# Patient Record
Sex: MALE | Race: WHITE | Employment: OTHER | URBAN - NONMETROPOLITAN AREA
[De-identification: names, ages, dates, MRNs, and addresses within clinical notes are randomized per-mention and may not be internally consistent; named-entity substitution may affect disease eponyms.]

---

## 2017-10-16 ENCOUNTER — HOSPITAL ENCOUNTER (OUTPATIENT)
Age: 62
Discharge: HOME | End: 2017-10-16
Payer: COMMERCIAL

## 2017-10-16 ENCOUNTER — HOSPITAL ENCOUNTER (OUTPATIENT)
Dept: GENERAL RADIOLOGY | Age: 62
Discharge: HOME | End: 2017-10-16
Payer: COMMERCIAL

## 2017-10-16 DIAGNOSIS — R52 PAIN: ICD-10-CM

## 2017-10-16 PROCEDURE — 73502 X-RAY EXAM HIP UNI 2-3 VIEWS: CPT

## 2020-12-07 ENCOUNTER — NURSE ONLY (OUTPATIENT)
Dept: LAB | Age: 65
End: 2020-12-07

## 2021-12-06 PROBLEM — E78.5 HYPERLIPIDEMIA: Status: ACTIVE | Noted: 2021-12-06

## 2021-12-06 PROBLEM — I10 HYPERTENSION: Status: ACTIVE | Noted: 2021-12-06

## 2021-12-06 PROBLEM — E11.9 TYPE 2 DIABETES MELLITUS WITHOUT COMPLICATION, WITHOUT LONG-TERM CURRENT USE OF INSULIN (HCC): Status: ACTIVE | Noted: 2021-12-06

## 2021-12-06 PROBLEM — G25.81 RESTLESS LEG: Status: ACTIVE | Noted: 2021-12-06

## 2021-12-29 ENCOUNTER — HOSPITAL ENCOUNTER (OUTPATIENT)
Age: 66
Discharge: HOME | End: 2021-12-29
Payer: MEDICARE

## 2021-12-29 ENCOUNTER — HOSPITAL ENCOUNTER (OUTPATIENT)
Dept: GENERAL RADIOLOGY | Age: 66
Discharge: HOME | End: 2021-12-29
Payer: MEDICARE

## 2021-12-29 DIAGNOSIS — M16.11 PRIMARY OSTEOARTHRITIS OF RIGHT HIP: ICD-10-CM

## 2021-12-29 LAB
ANION GAP SERPL CALCULATED.3IONS-SCNC: 13 MEQ/L (ref 8–16)
BACTERIA: ABNORMAL /HPF
BASOPHILS # BLD: 0.4 %
BASOPHILS ABSOLUTE: 0 THOU/MM3 (ref 0–0.1)
BILIRUBIN URINE: NEGATIVE
BLOOD, URINE: NEGATIVE
BUN BLDV-MCNC: 13 MG/DL (ref 7–22)
CALCIUM SERPL-MCNC: 9.6 MG/DL (ref 8.5–10.5)
CASTS 2: ABNORMAL /LPF
CASTS UA: ABNORMAL /LPF
CHARACTER, URINE: CLEAR
CHLORIDE BLD-SCNC: 102 MEQ/L (ref 98–111)
CO2: 23 MEQ/L (ref 23–33)
COLOR: YELLOW
CREAT SERPL-MCNC: 0.9 MG/DL (ref 0.4–1.2)
CRYSTALS, UA: ABNORMAL
EKG ATRIAL RATE: 63 BPM
EKG P AXIS: 67 DEGREES
EKG P-R INTERVAL: 174 MS
EKG Q-T INTERVAL: 376 MS
EKG QRS DURATION: 74 MS
EKG QTC CALCULATION (BAZETT): 384 MS
EKG R AXIS: 55 DEGREES
EKG T AXIS: 55 DEGREES
EKG VENTRICULAR RATE: 63 BPM
EOSINOPHIL # BLD: 3.1 %
EOSINOPHILS ABSOLUTE: 0.2 THOU/MM3 (ref 0–0.4)
EPITHELIAL CELLS, UA: ABNORMAL /HPF
ERYTHROCYTE [DISTWIDTH] IN BLOOD BY AUTOMATED COUNT: 12.9 % (ref 11.5–14.5)
ERYTHROCYTE [DISTWIDTH] IN BLOOD BY AUTOMATED COUNT: 43.2 FL (ref 35–45)
GFR SERPL CREATININE-BSD FRML MDRD: 84 ML/MIN/1.73M2
GLUCOSE BLD-MCNC: 151 MG/DL (ref 70–108)
GLUCOSE URINE: NEGATIVE MG/DL
HCT VFR BLD CALC: 48 % (ref 42–52)
HEMOGLOBIN: 16.2 GM/DL (ref 14–18)
IMMATURE GRANS (ABS): 0.01 THOU/MM3 (ref 0–0.07)
IMMATURE GRANULOCYTES: 0.2 %
KETONES, URINE: ABNORMAL
LEUKOCYTE ESTERASE, URINE: NEGATIVE
LYMPHOCYTES # BLD: 24.3 %
LYMPHOCYTES ABSOLUTE: 1.2 THOU/MM3 (ref 1–4.8)
MCH RBC QN AUTO: 31 PG (ref 26–33)
MCHC RBC AUTO-ENTMCNC: 33.8 GM/DL (ref 32.2–35.5)
MCV RBC AUTO: 91.8 FL (ref 80–94)
MISCELLANEOUS 2: ABNORMAL
MONOCYTES # BLD: 6.9 %
MONOCYTES ABSOLUTE: 0.3 THOU/MM3 (ref 0.4–1.3)
NITRITE, URINE: NEGATIVE
NUCLEATED RED BLOOD CELLS: 0 /100 WBC
PH UA: 5 (ref 5–9)
PLATELET # BLD: 214 THOU/MM3 (ref 130–400)
PMV BLD AUTO: 11.3 FL (ref 9.4–12.4)
POTASSIUM SERPL-SCNC: 4.3 MEQ/L (ref 3.5–5.2)
PROTEIN UA: ABNORMAL
RBC # BLD: 5.23 MILL/MM3 (ref 4.7–6.1)
RBC URINE: ABNORMAL /HPF
RENAL EPITHELIAL, UA: ABNORMAL
SEG NEUTROPHILS: 65.1 %
SEGMENTED NEUTROPHILS ABSOLUTE COUNT: 3.2 THOU/MM3 (ref 1.8–7.7)
SODIUM BLD-SCNC: 138 MEQ/L (ref 135–145)
SPECIFIC GRAVITY, URINE: 1.02 (ref 1–1.03)
UROBILINOGEN, URINE: 0.2 EU/DL (ref 0–1)
WBC # BLD: 4.9 THOU/MM3 (ref 4.8–10.8)
WBC UA: ABNORMAL /HPF
YEAST: ABNORMAL

## 2021-12-29 PROCEDURE — 36415 COLL VENOUS BLD VENIPUNCTURE: CPT

## 2021-12-29 PROCEDURE — 80048 BASIC METABOLIC PNL TOTAL CA: CPT

## 2021-12-29 PROCEDURE — 87641 MR-STAPH DNA AMP PROBE: CPT

## 2021-12-29 PROCEDURE — 81001 URINALYSIS AUTO W/SCOPE: CPT

## 2021-12-29 PROCEDURE — 93005 ELECTROCARDIOGRAM TRACING: CPT | Performed by: ORTHOPAEDIC SURGERY

## 2021-12-29 PROCEDURE — 85025 COMPLETE CBC W/AUTO DIFF WBC: CPT

## 2021-12-29 PROCEDURE — 71046 X-RAY EXAM CHEST 2 VIEWS: CPT

## 2021-12-30 LAB — MRSA SCREEN RT-PCR: NEGATIVE

## 2022-02-04 ENCOUNTER — HOSPITAL ENCOUNTER (OUTPATIENT)
Age: 67
Setting detail: OBSERVATION
Discharge: HOME | End: 2022-02-05
Attending: HOSPITALIST | Admitting: HOSPITALIST
Payer: MEDICARE

## 2022-02-04 ENCOUNTER — APPOINTMENT (OUTPATIENT)
Dept: CT IMAGING | Age: 67
End: 2022-02-04
Payer: MEDICARE

## 2022-02-04 ENCOUNTER — APPOINTMENT (OUTPATIENT)
Dept: GENERAL RADIOLOGY | Age: 67
End: 2022-02-04
Payer: MEDICARE

## 2022-02-04 ENCOUNTER — APPOINTMENT (OUTPATIENT)
Dept: CARDIAC CATH/INVASIVE PROCEDURES | Age: 67
End: 2022-02-04
Payer: MEDICARE

## 2022-02-04 DIAGNOSIS — R07.9 CHEST PAIN, UNSPECIFIED TYPE: Primary | ICD-10-CM

## 2022-02-04 LAB
ACTIVATED CLOTTING TIME: 303 SECONDS (ref 1–150)
ALBUMIN SERPL-MCNC: 4.1 G/DL (ref 3.5–5.1)
ALP BLD-CCNC: 139 U/L (ref 38–126)
ALT SERPL-CCNC: 15 U/L (ref 11–66)
ANION GAP SERPL CALCULATED.3IONS-SCNC: 13 MEQ/L (ref 8–16)
AST SERPL-CCNC: 17 U/L (ref 5–40)
BASOPHILS # BLD: 0.3 %
BASOPHILS ABSOLUTE: 0 THOU/MM3 (ref 0–0.1)
BILIRUB SERPL-MCNC: 0.3 MG/DL (ref 0.3–1.2)
BUN BLDV-MCNC: 17 MG/DL (ref 7–22)
CALCIUM SERPL-MCNC: 8.9 MG/DL (ref 8.5–10.5)
CHLORIDE BLD-SCNC: 104 MEQ/L (ref 98–111)
CO2: 23 MEQ/L (ref 23–33)
CREAT SERPL-MCNC: 0.8 MG/DL (ref 0.4–1.2)
D-DIMER QUANTITATIVE: 1325 NG/ML FEU (ref 0–500)
EOSINOPHIL # BLD: 1.1 %
EOSINOPHILS ABSOLUTE: 0.1 THOU/MM3 (ref 0–0.4)
ERYTHROCYTE [DISTWIDTH] IN BLOOD BY AUTOMATED COUNT: 12.8 % (ref 11.5–14.5)
ERYTHROCYTE [DISTWIDTH] IN BLOOD BY AUTOMATED COUNT: 42.3 FL (ref 35–45)
FLU A ANTIGEN: NEGATIVE
FLU B ANTIGEN: NEGATIVE
GFR SERPL CREATININE-BSD FRML MDRD: > 90 ML/MIN/1.73M2
GLUCOSE BLD-MCNC: 115 MG/DL (ref 70–108)
GLUCOSE BLD-MCNC: 139 MG/DL (ref 70–108)
GLUCOSE BLD-MCNC: 163 MG/DL (ref 70–108)
GLUCOSE BLD-MCNC: 170 MG/DL (ref 70–108)
GLUCOSE BLD-MCNC: 184 MG/DL (ref 70–108)
HCT VFR BLD CALC: 42.2 % (ref 42–52)
HEMOGLOBIN: 14 GM/DL (ref 14–18)
IMMATURE GRANS (ABS): 0.04 THOU/MM3 (ref 0–0.07)
IMMATURE GRANULOCYTES: 0.4 %
LV EF: 55 %
LVEF MODALITY: NORMAL
LYMPHOCYTES # BLD: 12.1 %
LYMPHOCYTES ABSOLUTE: 1.2 THOU/MM3 (ref 1–4.8)
MCH RBC QN AUTO: 30.3 PG (ref 26–33)
MCHC RBC AUTO-ENTMCNC: 33.2 GM/DL (ref 32.2–35.5)
MCV RBC AUTO: 91.3 FL (ref 80–94)
MONOCYTES # BLD: 6.1 %
MONOCYTES ABSOLUTE: 0.6 THOU/MM3 (ref 0.4–1.3)
NUCLEATED RED BLOOD CELLS: 0 /100 WBC
OSMOLALITY CALCULATION: 285.7 MOSMOL/KG (ref 275–300)
PLATELET # BLD: 264 THOU/MM3 (ref 130–400)
PMV BLD AUTO: 10.2 FL (ref 9.4–12.4)
POTASSIUM SERPL-SCNC: 3.9 MEQ/L (ref 3.5–5.2)
RBC # BLD: 4.62 MILL/MM3 (ref 4.7–6.1)
SARS-COV-2, NAAT: NOT  DETECTED
SEG NEUTROPHILS: 80 %
SEGMENTED NEUTROPHILS ABSOLUTE COUNT: 8 THOU/MM3 (ref 1.8–7.7)
SODIUM BLD-SCNC: 140 MEQ/L (ref 135–145)
TOTAL PROTEIN: 6.6 G/DL (ref 6.1–8)
TROPONIN T: 0.04 NG/ML
TROPONIN T: 0.34 NG/ML
TROPONIN T: 0.94 NG/ML
WBC # BLD: 10 THOU/MM3 (ref 4.8–10.8)

## 2022-02-04 PROCEDURE — 2500000003 HC RX 250 WO HCPCS

## 2022-02-04 PROCEDURE — G0378 HOSPITAL OBSERVATION PER HR: HCPCS

## 2022-02-04 PROCEDURE — 99284 EMERGENCY DEPT VISIT MOD MDM: CPT

## 2022-02-04 PROCEDURE — C1769 GUIDE WIRE: HCPCS

## 2022-02-04 PROCEDURE — 96372 THER/PROPH/DIAG INJ SC/IM: CPT

## 2022-02-04 PROCEDURE — 6360000002 HC RX W HCPCS

## 2022-02-04 PROCEDURE — 6370000000 HC RX 637 (ALT 250 FOR IP): Performed by: INTERNAL MEDICINE

## 2022-02-04 PROCEDURE — 93458 L HRT ARTERY/VENTRICLE ANGIO: CPT

## 2022-02-04 PROCEDURE — 6360000004 HC RX CONTRAST MEDICATION: Performed by: NURSE PRACTITIONER

## 2022-02-04 PROCEDURE — C1874 STENT, COATED/COV W/DEL SYS: HCPCS

## 2022-02-04 PROCEDURE — C1725 CATH, TRANSLUMIN NON-LASER: HCPCS

## 2022-02-04 PROCEDURE — 99220 PR INITIAL OBSERVATION CARE/DAY 70 MINUTES: CPT | Performed by: HOSPITALIST

## 2022-02-04 PROCEDURE — 71275 CT ANGIOGRAPHY CHEST: CPT

## 2022-02-04 PROCEDURE — 6370000000 HC RX 637 (ALT 250 FOR IP)

## 2022-02-04 PROCEDURE — 36415 COLL VENOUS BLD VENIPUNCTURE: CPT

## 2022-02-04 PROCEDURE — 92928 PRQ TCAT PLMT NTRAC ST 1 LES: CPT | Performed by: INTERNAL MEDICINE

## 2022-02-04 PROCEDURE — 6360000004 HC RX CONTRAST MEDICATION: Performed by: INTERNAL MEDICINE

## 2022-02-04 PROCEDURE — 87635 SARS-COV-2 COVID-19 AMP PRB: CPT

## 2022-02-04 PROCEDURE — 87804 INFLUENZA ASSAY W/OPTIC: CPT

## 2022-02-04 PROCEDURE — 93306 TTE W/DOPPLER COMPLETE: CPT

## 2022-02-04 PROCEDURE — C1894 INTRO/SHEATH, NON-LASER: HCPCS

## 2022-02-04 PROCEDURE — 80053 COMPREHEN METABOLIC PANEL: CPT

## 2022-02-04 PROCEDURE — 71046 X-RAY EXAM CHEST 2 VIEWS: CPT

## 2022-02-04 PROCEDURE — 82948 REAGENT STRIP/BLOOD GLUCOSE: CPT

## 2022-02-04 PROCEDURE — 2580000003 HC RX 258: Performed by: INTERNAL MEDICINE

## 2022-02-04 PROCEDURE — 93458 L HRT ARTERY/VENTRICLE ANGIO: CPT | Performed by: INTERNAL MEDICINE

## 2022-02-04 PROCEDURE — 84484 ASSAY OF TROPONIN QUANT: CPT

## 2022-02-04 PROCEDURE — 6360000002 HC RX W HCPCS: Performed by: NURSE PRACTITIONER

## 2022-02-04 PROCEDURE — 85379 FIBRIN DEGRADATION QUANT: CPT

## 2022-02-04 PROCEDURE — 85347 COAGULATION TIME ACTIVATED: CPT

## 2022-02-04 PROCEDURE — C1887 CATHETER, GUIDING: HCPCS

## 2022-02-04 PROCEDURE — 93005 ELECTROCARDIOGRAM TRACING: CPT | Performed by: NURSE PRACTITIONER

## 2022-02-04 PROCEDURE — 85025 COMPLETE CBC W/AUTO DIFF WBC: CPT

## 2022-02-04 PROCEDURE — 2580000003 HC RX 258: Performed by: HOSPITALIST

## 2022-02-04 PROCEDURE — 99223 1ST HOSP IP/OBS HIGH 75: CPT | Performed by: INTERNAL MEDICINE

## 2022-02-04 PROCEDURE — C9600 PERC DRUG-EL COR STENT SING: HCPCS

## 2022-02-04 PROCEDURE — 6370000000 HC RX 637 (ALT 250 FOR IP): Performed by: HOSPITALIST

## 2022-02-04 RX ORDER — SODIUM CHLORIDE 0.9 % (FLUSH) 0.9 %
10 SYRINGE (ML) INJECTION EVERY 12 HOURS SCHEDULED
Status: DISCONTINUED | OUTPATIENT
Start: 2022-02-04 | End: 2022-02-04 | Stop reason: SDUPTHER

## 2022-02-04 RX ORDER — ACETAMINOPHEN 325 MG/1
650 TABLET ORAL EVERY 4 HOURS PRN
Status: DISCONTINUED | OUTPATIENT
Start: 2022-02-04 | End: 2022-02-05 | Stop reason: HOSPADM

## 2022-02-04 RX ORDER — SODIUM CHLORIDE 0.9 % (FLUSH) 0.9 %
10 SYRINGE (ML) INJECTION PRN
Status: DISCONTINUED | OUTPATIENT
Start: 2022-02-04 | End: 2022-02-04 | Stop reason: SDUPTHER

## 2022-02-04 RX ORDER — POTASSIUM CHLORIDE 7.45 MG/ML
10 INJECTION INTRAVENOUS PRN
Status: DISCONTINUED | OUTPATIENT
Start: 2022-02-04 | End: 2022-02-05 | Stop reason: HOSPADM

## 2022-02-04 RX ORDER — DEXTROSE MONOHYDRATE 25 G/50ML
12.5 INJECTION, SOLUTION INTRAVENOUS PRN
Status: DISCONTINUED | OUTPATIENT
Start: 2022-02-04 | End: 2022-02-04 | Stop reason: SDUPTHER

## 2022-02-04 RX ORDER — ACETAMINOPHEN 325 MG/1
650 TABLET ORAL EVERY 6 HOURS PRN
Status: DISCONTINUED | OUTPATIENT
Start: 2022-02-04 | End: 2022-02-04 | Stop reason: SDUPTHER

## 2022-02-04 RX ORDER — ONDANSETRON 4 MG/1
4 TABLET, ORALLY DISINTEGRATING ORAL EVERY 8 HOURS PRN
Status: DISCONTINUED | OUTPATIENT
Start: 2022-02-04 | End: 2022-02-05 | Stop reason: HOSPADM

## 2022-02-04 RX ORDER — ROSUVASTATIN CALCIUM 20 MG/1
40 TABLET, COATED ORAL NIGHTLY
Status: DISCONTINUED | OUTPATIENT
Start: 2022-02-04 | End: 2022-02-05 | Stop reason: HOSPADM

## 2022-02-04 RX ORDER — HEPARIN SODIUM 10000 [USP'U]/100ML
5-30 INJECTION, SOLUTION INTRAVENOUS CONTINUOUS
Status: DISCONTINUED | OUTPATIENT
Start: 2022-02-04 | End: 2022-02-04

## 2022-02-04 RX ORDER — ACETAMINOPHEN 650 MG/1
650 SUPPOSITORY RECTAL EVERY 6 HOURS PRN
Status: DISCONTINUED | OUTPATIENT
Start: 2022-02-04 | End: 2022-02-05 | Stop reason: HOSPADM

## 2022-02-04 RX ORDER — HEPARIN SODIUM 1000 [USP'U]/ML
60 INJECTION, SOLUTION INTRAVENOUS; SUBCUTANEOUS ONCE
Status: DISCONTINUED | OUTPATIENT
Start: 2022-02-04 | End: 2022-02-04

## 2022-02-04 RX ORDER — NICOTINE POLACRILEX 4 MG
15 LOZENGE BUCCAL PRN
Status: DISCONTINUED | OUTPATIENT
Start: 2022-02-04 | End: 2022-02-05 | Stop reason: HOSPADM

## 2022-02-04 RX ORDER — SODIUM CHLORIDE 9 MG/ML
25 INJECTION, SOLUTION INTRAVENOUS PRN
Status: DISCONTINUED | OUTPATIENT
Start: 2022-02-04 | End: 2022-02-04 | Stop reason: SDUPTHER

## 2022-02-04 RX ORDER — SODIUM CHLORIDE 0.9 % (FLUSH) 0.9 %
5-40 SYRINGE (ML) INJECTION EVERY 12 HOURS SCHEDULED
Status: DISCONTINUED | OUTPATIENT
Start: 2022-02-04 | End: 2022-02-05 | Stop reason: HOSPADM

## 2022-02-04 RX ORDER — LISINOPRIL 20 MG/1
20 TABLET ORAL DAILY
Status: DISCONTINUED | OUTPATIENT
Start: 2022-02-04 | End: 2022-02-05

## 2022-02-04 RX ORDER — HEPARIN SODIUM 1000 [USP'U]/ML
30 INJECTION, SOLUTION INTRAVENOUS; SUBCUTANEOUS PRN
Status: DISCONTINUED | OUTPATIENT
Start: 2022-02-04 | End: 2022-02-04

## 2022-02-04 RX ORDER — DEXTROSE MONOHYDRATE 50 MG/ML
100 INJECTION, SOLUTION INTRAVENOUS PRN
Status: DISCONTINUED | OUTPATIENT
Start: 2022-02-04 | End: 2022-02-05 | Stop reason: HOSPADM

## 2022-02-04 RX ORDER — ASPIRIN 81 MG/1
81 TABLET ORAL DAILY
Status: DISCONTINUED | OUTPATIENT
Start: 2022-02-05 | End: 2022-02-05 | Stop reason: HOSPADM

## 2022-02-04 RX ORDER — ONDANSETRON 2 MG/ML
4 INJECTION INTRAMUSCULAR; INTRAVENOUS EVERY 6 HOURS PRN
Status: DISCONTINUED | OUTPATIENT
Start: 2022-02-04 | End: 2022-02-05 | Stop reason: HOSPADM

## 2022-02-04 RX ORDER — MAGNESIUM SULFATE IN WATER 40 MG/ML
2000 INJECTION, SOLUTION INTRAVENOUS PRN
Status: DISCONTINUED | OUTPATIENT
Start: 2022-02-04 | End: 2022-02-05 | Stop reason: HOSPADM

## 2022-02-04 RX ORDER — HEPARIN SODIUM 1000 [USP'U]/ML
60 INJECTION, SOLUTION INTRAVENOUS; SUBCUTANEOUS PRN
Status: DISCONTINUED | OUTPATIENT
Start: 2022-02-04 | End: 2022-02-04

## 2022-02-04 RX ORDER — SODIUM CHLORIDE 0.9 % (FLUSH) 0.9 %
5-40 SYRINGE (ML) INJECTION PRN
Status: DISCONTINUED | OUTPATIENT
Start: 2022-02-04 | End: 2022-02-05 | Stop reason: HOSPADM

## 2022-02-04 RX ORDER — POLYETHYLENE GLYCOL 3350 17 G/17G
17 POWDER, FOR SOLUTION ORAL DAILY PRN
Status: DISCONTINUED | OUTPATIENT
Start: 2022-02-04 | End: 2022-02-05 | Stop reason: HOSPADM

## 2022-02-04 RX ORDER — AMLODIPINE BESYLATE 5 MG/1
5 TABLET ORAL DAILY
Status: DISCONTINUED | OUTPATIENT
Start: 2022-02-04 | End: 2022-02-04

## 2022-02-04 RX ORDER — SODIUM CHLORIDE 9 MG/ML
25 INJECTION, SOLUTION INTRAVENOUS PRN
Status: DISCONTINUED | OUTPATIENT
Start: 2022-02-04 | End: 2022-02-05 | Stop reason: HOSPADM

## 2022-02-04 RX ORDER — ATORVASTATIN CALCIUM 10 MG/1
10 TABLET, FILM COATED ORAL DAILY
Status: DISCONTINUED | OUTPATIENT
Start: 2022-02-04 | End: 2022-02-04

## 2022-02-04 RX ORDER — PAROXETINE HYDROCHLORIDE 20 MG/1
20 TABLET, FILM COATED ORAL EVERY MORNING
Status: DISCONTINUED | OUTPATIENT
Start: 2022-02-04 | End: 2022-02-05 | Stop reason: HOSPADM

## 2022-02-04 RX ORDER — POTASSIUM CHLORIDE 20 MEQ/1
40 TABLET, EXTENDED RELEASE ORAL PRN
Status: DISCONTINUED | OUTPATIENT
Start: 2022-02-04 | End: 2022-02-05 | Stop reason: HOSPADM

## 2022-02-04 RX ADMIN — PAROXETINE HYDROCHLORIDE 20 MG: 20 TABLET, FILM COATED ORAL at 08:03

## 2022-02-04 RX ADMIN — TICAGRELOR 180 MG: 90 TABLET ORAL at 12:17

## 2022-02-04 RX ADMIN — INSULIN LISPRO 2 UNITS: 100 INJECTION, SOLUTION INTRAVENOUS; SUBCUTANEOUS at 17:16

## 2022-02-04 RX ADMIN — AMLODIPINE BESYLATE 5 MG: 5 TABLET ORAL at 08:04

## 2022-02-04 RX ADMIN — METOPROLOL TARTRATE 25 MG: 25 TABLET, FILM COATED ORAL at 21:43

## 2022-02-04 RX ADMIN — IOPAMIDOL 80 ML: 755 INJECTION, SOLUTION INTRAVENOUS at 03:42

## 2022-02-04 RX ADMIN — ASPIRIN 325 MG: 325 TABLET, COATED ORAL at 08:04

## 2022-02-04 RX ADMIN — ATORVASTATIN CALCIUM 10 MG: 10 TABLET, FILM COATED ORAL at 08:04

## 2022-02-04 RX ADMIN — ROSUVASTATIN CALCIUM 40 MG: 20 TABLET, FILM COATED ORAL at 21:50

## 2022-02-04 RX ADMIN — IOPAMIDOL 213 ML: 755 INJECTION, SOLUTION INTRAVENOUS at 12:17

## 2022-02-04 RX ADMIN — LISINOPRIL 20 MG: 20 TABLET ORAL at 08:04

## 2022-02-04 RX ADMIN — METOPROLOL TARTRATE 25 MG: 25 TABLET, FILM COATED ORAL at 12:48

## 2022-02-04 RX ADMIN — SODIUM CHLORIDE, PRESERVATIVE FREE 10 ML: 5 INJECTION INTRAVENOUS at 21:44

## 2022-02-04 RX ADMIN — SODIUM CHLORIDE, PRESERVATIVE FREE 10 ML: 5 INJECTION INTRAVENOUS at 08:04

## 2022-02-04 RX ADMIN — ENOXAPARIN SODIUM 105 MG: 150 INJECTION SUBCUTANEOUS at 03:50

## 2022-02-04 ASSESSMENT — ENCOUNTER SYMPTOMS
COUGH: 0
NAUSEA: 0
SORE THROAT: 0
VOMITING: 0
RHINORRHEA: 0
CHEST TIGHTNESS: 0
SHORTNESS OF BREATH: 1
COLOR CHANGE: 0

## 2022-02-04 NOTE — ED TRIAGE NOTES
Patient arrives to the ED with c/o mid sternal non radiating chest pain starting around 1130 pm while pt was at home sitting, pain increased after he walked up stairs. Denies SOB, denies n/v, Pt took aspirin 324mg at home, EMS administered nitro x3, pt arrives with no further pain. GCS 15.

## 2022-02-04 NOTE — BRIEF OP NOTE
[address] [address]  Sedation/Analgesia Post Sedation Record    Pt Name: Gabrielle Diana  Account number: [de-identified]  MRN: [id number]  YOB: 1955  Procedure Performed By: Niecy Araujo MD MD   Primary Care Physician: Muriel Gallegos APRN - CNP  Date: 2/4/2022    POST-PROCEDURE    Physicians/Assistants: Niecy Araujo MD MD     Procedure Performed:Cath/ PCI      Sedation/Anesthesia: Versed/ Fentanyl and 2% xylocaine local anesthesia. Estimated Blood Loss: < 50 ml. Specimens Removed: None         Disposition of Specimen: N/A        Complications: No Immediate Complications. Post-procedure Diagnosis/Findings:        NSTEMI   Severe 95% stenosis of proximal LAD, s/p successful PCI/MARYLOU     Recommendations:  Bed rest for 4 hours post procedure   Monitor on Telemetry   Optimize medical therapy for Coronary Artery Disease  Aggressive risk factor modification   Access site care  Antiplatelet therapy: ASA 81 mg PO daily and Birlinta 90 mg PO BID   High intensity statin therapy  Obtain a Full Echocardiogram   IVF: NS at 75 cc/h  AM Labs: BMP, CBC  Outpatient follow up in office in 2 weeks      Above findings and plan of care were discussed with patient (attempted to call his daughter, no answer), questions were answered, agreeable with plan.        Niecy Araujo MD, Michael Avalos   Electronically signed 2/4/2022 at 12:18 PM  Interventional Cardiology

## 2022-02-04 NOTE — CARE COORDINATION
2/4/22, 1:34 PM EST  DISCHARGE PLANNING EVALUATION:    Donna Cartagena       Admitted: 2/4/2022/ [facility] day: 0   Location: 3B-31/031-A Reason for admit: Chest pain [R07.9]  Chest pain, unspecified type [R07.9]   PMH:  has a past medical history of Depression, Diabetes mellitus ([address] Hyperlipidemia, Hypertension, Restless leg, and Sleep apnea. Procedure:   2/4 Cardiac cath: NSTEMI, Severe 95% stenosis of proximal LAD, s/p successful PCI/MARYLOU. Barriers to Discharge:  Admitted through ED as observation with chest pain. Troponins 0.042 and 0.339. [address] Cardiology. Room air, sats 92-98%. Norvasc, ASA, Lipitor, diabetes management, Lisinopril, Paxil, Lovenox. Electrolyte replacements. Taken to cath lab today, PCI/MARYLOU done. PCP: Roni Bucio, SISI - CNP   %    Patient Goals/Plan/Treatment Preferences: Spoke with Washington Hernandez. He lives at home with his wife. Pt had recent hip surgery, he has a cane, standard walker and a 2 wheeled walker at home. [facility] at this time. Transportation/Food Security/Housekeeping Addressed:  No issues identified.

## 2022-02-04 NOTE — SIGNIFICANT EVENT
Noted elevated troponin this AM.  Restarted BID lovenox at 1mg/kg. Changed to NPO. Patient asymptomatic and resting comfortably. Normal vital signs. Called Fritz, aware. Patient also to receive aspirin. Deferring plavix in case of possible CABG.

## 2022-02-04 NOTE — FLOWSHEET NOTE
Pt was alone at the time of the visit. He was dealing with chest pain but was in good spirit. He wanted to prayer to heal and he was anointed. 02/04/22 1618   Encounter Summary   Services provided to: Patient   Referral/Consult From: Rounding   Place of [address] [place] [address] Visiting No  (2/4)   Complexity of Encounter Moderate   Length of Encounter 15 minutes   Spiritual/Congregational   Type Spiritual support   Assessment Approachable;Calm   Intervention Prayer;Nurtured hope   Outcome Connection/belonging;Expressed gratitude;Encouraged; Hopeful   Sacraments   Sacrament of Sick-Anointing Anointed

## 2022-02-04 NOTE — H&P
Hospitalist - History & Physical      Patient: Juliana Burrell    Unit/Bed:3B-31/031-A  YOB: 1955  MRN: [id number]   Acct: [de-identified]   PCP: SISI Smith CNP    Date of Service: Pt seen/examined on 02/04/22  and Admitted to Observation with expected LOS less than two midnights due to medical therapy. Chief Complaint: Chest pain    Assessment and Plan:-  1. Chest pain -patient's history is consistent with typical angina. Has minimal elevation of troponin. Will follow troponin closely. Patient was given full dose Lovenox in the emergency room. For now we will reduce it down to DVT dose. If second troponin goes up, patient will need full anticoagulation. Cardiology consult. Patient will need stress test prior to discharge. Aspirin. 2. Diabetes mellitus -sliding-scale insulin  3. Essential hypertension hyperlipidemia -on statin. Continue home dose of amlodipine. History Of Present Illness: This is a 27-year-old male with past medical history significant for diabetes mellitus, hypertension and hyperlipidemia presenting with chest pain. Chest pain started when patient climbed up the stairs to go to bed. Pain was substernal region and was pressure like in quality. No significant radiation noted per patient. Chest pain was improved when he was given nitro in route to emergency room. Patient had stress test over 10 years ago which was negative. Patient never had cardiac cath in the past.  Patient denies history of smoking or drug abuse. Patient's chest pain-free at this time. Denies fever chills nausea vomiting diarrhea constipation. No palpitation. No shortness of breath or cough. No abdominal issue. No urinary symptoms. No acute change in neurological status.       Past Medical History:        Diagnosis Date    Depression     Diabetes mellitus ([address]     dx about 3 months ago    Hyperlipidemia     Hypertension     Restless leg     Sleep apnea Past Surgical History:        Procedure Laterality Date    JOINT REPLACEMENT      Rt hip replaced about 3 weeks ago       Home Medications:   No current facility-administered medications on file prior to encounter. Current Outpatient Medications on File Prior to Encounter   Medication Sig Dispense Refill    metFORMIN (GLUCOPHAGE-XR) 500 MG extended release tablet Take 1 tablet by mouth once daily with breakfast 30 tablet 11    amLODIPine (NORVASC) 5 MG tablet Take 1 tablet by mouth daily 90 tablet 3    lisinopril (PRINIVIL;ZESTRIL) 20 MG tablet Take 1 tablet by mouth daily 90 tablet 3    PARoxetine (PAXIL) 20 MG tablet Take 1 tablet by mouth every morning 90 tablet 3    atorvastatin (LIPITOR) 10 MG tablet Take 1 tablet by mouth daily 90 tablet 3    meloxicam (MOBIC) 15 MG tablet Take 15 mg by mouth daily (Patient not taking: Reported on 12/6/2021)         Allergies:    Patient has no known allergies. Social History:    reports that he has never smoked. His smokeless tobacco use includes chew. He reports previous drug use. He reports that he does not drink alcohol. Family History:       Problem Relation Age of Onset    Cancer Mother     Cancer Father        Diet:  ADULT DIET; Regular; 4 carb choices (60 gm/meal); No Added Salt (3-4 gm)    Review of systems:   Pertinent positives as noted in the HPI. All other systems reviewed and negative. PHYSICAL EXAM:  BP (!) 143/85   Pulse 68   Temp 98 °F (36.7 °C) (Oral)   Resp 18   Ht 5' 11\" (1.803 m)   Wt 240 lb (108.9 kg)   SpO2 98%   BMI 33.47 kg/m²   General appearance: No apparent distress, appears stated age and cooperative. HEENT: Normal cephalic, atraumatic without obvious deformity. Pupils equal, round, and reactive to light. Extra ocular muscles intact. Conjunctivae/corneas clear. Neck: Supple, with full range of motion. No jugular venous distention. Trachea midline. Respiratory:  Normal respiratory effort.  Clear to auscultation, bilaterally without Rales/Wheezes/Rhonchi. Cardiovascular: Regular rate and rhythm with normal S1/S2 without murmurs, rubs or gallops. Abdomen: Soft, non-tender, non-distended with normal bowel sounds. Musculoskeletal:  No clubbing, cyanosis or edema bilaterally. Skin: Skin color, texture, turgor normal.  No rashes or lesions. Neurologic:  Neurovascularly intact without any focal sensory/motor deficits. Cranial nerves: II-XII intact, grossly non-focal.  Psychiatric: Alert and oriented, thought content appropriate, normal insight  Capillary Refill: Brisk,< 3 seconds   Peripheral Pulses: +2 palpable, equal bilaterally     Labs:   Recent Labs     02/04/22 0200   WBC 10.0   HGB 14.0   HCT 42.2        Recent Labs     02/04/22 0200      K 3.9      CO2 23   BUN 17   CREATININE 0.8   CALCIUM 8.9     Recent Labs     02/04/22 0200   AST 17   ALT 15   BILITOT 0.3   ALKPHOS 139*     No results for input(s): INR in the last 72 hours. No results for input(s): Madhav Burris in the last 72 hours. Urinalysis:    Lab Results   Component Value Date    NITRU NEGATIVE 12/29/2021    WBCUA 0-2 12/29/2021    BACTERIA NONE SEEN 12/29/2021    RBCUA 0-2 12/29/2021    BLOODU NEGATIVE 12/29/2021    SPECGRAV >=1.030 12/06/2021    GLUCOSEU NEGATIVE 12/29/2021       Radiology:   CTA CHEST W WO CONTRAST   Final Result   No acute findings. No pulmonary embolism. Nonemergent/incidental findings in the report. This document has been electronically signed by: Travon Hammer MD on    02/04/2022 04:46 AM      All CTs at this facility use dose modulation techniques and iterative    reconstructions, and/or weight-based dosing   when appropriate to reduce radiation to a low as reasonably achievable. XR CHEST (2 VW)   Final Result   No acute findings.       This document has been electronically signed by: Travon Hammer MD on    02/04/2022 02:53 AM        XR CHEST (2 VW)    Result Date: 2/4/2022  Chest X-ray, 2 Views COMPARISON: None FINDINGS: No consolidation. No pleural effusion. No pneumothorax. No cardiomegaly. No acute fracture. No acute findings. This document has been electronically signed by: Liliya Lopes MD on 02/04/2022 02:53 AM    CTA CHEST W WO CONTRAST    Result Date: 2/4/2022  CT Angiography Chest W Contrast 3D Postprocessing COMPARISON: CR,SR - XR CHEST STANDARD (2 VW) - 02/04/2022 02:27 AM EST FINDINGS: No pulmonary embolism. No thoracic aortic aneurysm or dissection. No consolidation. No mass. Bilateral atelectasis. No pleural effusion. No pneumothorax. No cardiomegaly. No pericardial effusion. No pathologically enlarged lymph nodes. Calcified lymph nodes. No acute fracture. Degenerative changes in the spine. Small hiatal hernia. Calcified granulomas in the spleen. No acute findings. No pulmonary embolism. Nonemergent/incidental findings in the report. This document has been electronically signed by: Liliya Lopes MD on 02/04/2022 04:46 AM All CTs at this facility use dose modulation techniques and iterative reconstructions, and/or weight-based dosing when appropriate to reduce radiation to a low as reasonably achievable. EKG: Sinus rhythm. No acute ischemic changes.     Electronically signed by Walt Hughes DO on 2/4/2022 at 4:59 AM

## 2022-02-04 NOTE — ED NOTES
Bed: 010A  Expected date:   Expected time:   Means of arrival:   Comments:  Bibi Rangel RN  02/04/22 0151

## 2022-02-04 NOTE — CONSULTS
REason Of consultation-  Cp and elevated troponin    Primary cardiologist- none    HPI   Mr. Vikas Steen is a 78 y.o. male who presents to the ED for evaluation of chest pain that started  around 11 PM he was walking upstairs to go to bed. The chest pain was severe substernal chest pain, 8/10, pressure and  Sharp, non radiating no associated symptom of sob or diaphoresis. The chest pain persisted for several minutes with no resolution and hence call 911 And EMS brought him to ER. In the squad pat was give 3 doses of NTG SL and the chest pain resolved with that. No chest pain then after. Hx of sob on exertion and no chest prior in the last several years. He notes he had similar chest pain approximately 10 years ago, at that time he had a stress test and an echocardiogram that was negative. He never went on to have cardiac cath. He notes a history of hypertension, diabetes, hyperlipidemia. He notes 3 weeks ago he had a left total hip replacement. He notes he has been having increased bilateral knee arthritis, he notes this is chronic but worsened recently. He denies any swelling in his legs. He denies nausea vomiting.     Currently cp free  And Lovenox around 3 am        Chief Complaint   Patient presents with    Chest Pain       Patient Active Problem List   Diagnosis    Hypertension    Type 2 diabetes mellitus without complication, without long-term current use of insulin (HCC)    Hyperlipidemia    Restless leg    Chest pain       Past Surgical History:   Procedure Laterality Date    JOINT REPLACEMENT      Rt hip replaced about 3 weeks ago       No Known Allergies     Family History   Problem Relation Age of Onset    Cancer Mother     Cancer Father         Social History     Socioeconomic History    Marital status:      Spouse name: Not on file    Number of children: Not on file    Years of education: Not on file    Highest education level: Not on file   Occupational History    Not on file   Tobacco Use    Smoking status: Never Smoker    Smokeless tobacco: Current User     Types: Chew   Substance and Sexual Activity    Alcohol use: No    Drug use: Not Currently    Sexual activity: Not on file   Other Topics Concern    Not on file   Social History Narrative    Not on file     Social Determinants of Health     Financial Resource Strain:     Difficulty of Paying Living Expenses: Not on file   Food Insecurity:     Worried About Running Out of Food in the Last Year: Not on file    Noble of Food in the Last Year: Not on file   Transportation Needs:     Lack of Transportation (Medical): Not on file    Lack of Transportation (Non-Medical):  Not on file   Physical Activity:     Days of Exercise per Week: Not on file    Minutes of Exercise per Session: Not on file   Stress:     Feeling of Stress : Not on file   Social Connections:     Frequency of Communication with Friends and Family: Not on file    Frequency of Social Gatherings with Friends and Family: Not on file    Attends Jain Services: Not on file    Active Member of [address] [place] [address] or Organizations: Not on file    Attends Club or Organization Meetings: Not on file    Marital Status: Not on file   Intimate Partner Violence:     Fear of Current or Ex-Partner: Not on file    Emotionally Abused: Not on file    Physically Abused: Not on file    Sexually Abused: Not on file   Housing Stability:     Unable to Pay for Housing in the Last Year: Not on file    Number of [place] in the Last Year: Not on file    Unstable Housing in the Last Year: Not on file       Current Facility-Administered Medications   Medication Dose Route Frequency Provider Last Rate Last Admin    amLODIPine (NORVASC) tablet 5 mg  5 mg Oral Daily Esvin Duncan, DO   5 mg at 02/04/22 0804    atorvastatin (LIPITOR) tablet 10 mg  10 mg Oral Daily Esvin Duncan, DO   10 mg at 02/04/22 0804    lisinopril (PRINIVIL;ZESTRIL) tablet 20 mg  20 mg Oral Daily [facility], DO   20 mg at 02/04/22 0804    PARoxetine (PAXIL) tablet 20 mg  20 mg Oral QAM [facility], DO   20 mg at 02/04/22 0803    sodium chloride flush 0.9 % injection 10 mL  10 mL IntraVENous 2 times per day [facility], DO   10 mL at 02/04/22 0804    sodium chloride flush 0.9 % injection 10 mL  10 mL IntraVENous PRN [facility], DO        0.9 % sodium chloride infusion  25 mL IntraVENous PRN [facility], DO        ondansetron (ZOFRAN-ODT) disintegrating tablet 4 mg  4 mg Oral Q8H PRN [facility], DO        Or    ondansetron [facility]) injection 4 mg  4 mg IntraVENous Q6H PRN [facility], DO        polyethylene glycol (GLYCOLAX) packet 17 g  17 g Oral Daily PRN [facility], DO        acetaminophen (TYLENOL) tablet 650 mg  650 mg Oral Q6H PRN [facility], DO        Or    acetaminophen (TYLENOL) suppository 650 mg  650 mg Rectal Q6H PRN [facility], DO        potassium chloride (KLOR-CON M) extended release tablet 40 mEq  40 mEq Oral PRN [facility], DO        Or    potassium bicarb-citric acid (EFFER-K) effervescent tablet 40 mEq  40 mEq Oral PRN [facility], DO        Or    potassium chloride 10 mEq/100 mL IVPB (Peripheral Line)  10 mEq IntraVENous PRN [facility], DO        magnesium sulfate 2000 mg in 50 mL IVPB premix  2,000 mg IntraVENous PRN [facility], DO        insulin lispro (HUMALOG) injection vial 0-12 Units  0-12 Units SubCUTAneous TID WC [facility], DO        insulin lispro (HUMALOG) injection vial 0-6 Units  0-6 Units SubCUTAneous Nightly [facility], DO        glucose (GLUTOSE) 40 % oral gel 15 g  15 g Oral PRN [facility], DO        glucagon (rDNA) injection 1 mg  1 mg IntraMUSCular PRN [facility], DO        dextrose 5 % solution  100 mL/hr IntraVENous PRN [facility], DO        dextrose bolus (hypoglycemia) 10% 125 mL  125 mL IntraVENous PRN [facility], DO        aspirin EC tablet 325 mg  325 mg Oral Daily Walt Hughes DO   325 mg at 02/04/22 0804    enoxaparin (LOVENOX) injection 100 mg  1 mg/kg SubCUTAneous Q12H Reina Jiang MD           Review of Systems -     General ROS: negative  Psychological ROS: negative  Hematological and Lymphatic ROS: No history of blood clots or bleeding disorder. Respiratory ROS: no cough,  or wheezing, the rest see HPI  Cardiovascular ROS: See HPI  Gastrointestinal ROS: negative  Genito-Urinary ROS: no dysuria, trouble voiding, or hematuria  Musculoskeletal ROS: negative  Neurological ROS: no TIA or stroke symptoms  Dermatological ROS: negative      Blood pressure 133/85, pulse 67, temperature 98 °F (36.7 °C), temperature source Oral, resp. rate 18, height 5' 11\" (1.803 m), weight 231 lb 3.2 oz (104.9 kg), SpO2 92 %. Physical Examination:    General appearance - alert, well appearing, and in no distress  HEENT- Pink conjunctiva  , Non-icteri sclera,PERRLA  Mental status - alert, oriented to person, place, and time  Neck - supple, no significant adenopathy, no JVD, or carotid bruits  Chest - clear to auscultation, no wheezes, rales or rhonchi, symmetric air entry  Heart - normal rate, regular rhythm, normal S1, S2, no murmurs, rubs, clicks or gallops  Abdomen - soft, nontender, nondistended, no masses or organomegaly  MIKEY- no CVA or flank tenderness, no suprapubic tenderness  Neurological - alert, oriented, normal speech, no focal findings or movement disorder noted  Musculoskeletal/limbs - no joint tenderness, deformity or swelling   - peripheral pulses normal, no pedal edema, no clubbing or cyanosis  Skin - normal coloration and turgor, no rashes, no suspicious skin lesions noted  Psych- appropriate mood and affect    Lab  No results for input(s): CKTOTAL, CKMB, CKMBINDEX, TROPONINI in the last 72 hours.   CBC:   Lab Results   Component Value Date    WBC 10.0 02/04/2022    RBC 4.62 02/04/2022    RBC 5.13 12/29/2021    HGB 14.0 02/04/2022    HCT 42.2 02/04/2022    MCV 91.3 02/04/2022    MCH 30.3 02/04/2022    MCHC 33.2 02/04/2022    RDW 13.2 12/29/2021     02/04/2022    MPV 10.2 02/04/2022     BMP:    Lab Results   Component Value Date     02/04/2022    K 3.9 02/04/2022     02/04/2022    CO2 23 02/04/2022    BUN 17 02/04/2022    LABALBU 4.1 02/04/2022    CREATININE 0.8 02/04/2022    CALCIUM 8.9 02/04/2022    LABGLOM >90 02/04/2022    GLUCOSE 184 02/04/2022    GLUCOSE 150 12/29/2021     Hepatic Function Panel:    Lab Results   Component Value Date    ALKPHOS 139 02/04/2022    ALT 15 02/04/2022    AST 17 02/04/2022    PROT 6.6 02/04/2022    BILITOT 0.3 02/04/2022    BILIDIR 0.1 08/02/2021    LABALBU 4.1 02/04/2022     Magnesium:  No results found for: MG  Warfarin PT/INR:  No components found for: PTPATWAR, PTINRWAR  HgBA1c:    Lab Results   Component Value Date    LABA1C 6.8 12/29/2021     FLP:    Lab Results   Component Value Date    TRIG 172 12/29/2021    HDL 42 12/29/2021    LDLCALC 84 12/29/2021    LABVLDL 34 12/29/2021     TSH:  No results found for: TSH    ekg 2/4/22  NSR, nonsp ST abn with ST depression    Troponin 0.042 and now 0.339    Assessment  NSTEMI  HTN  HLP  DMII    Nonsmoker  NO FHX of CAD      Plan     Patient Seen, Chart, Consults notes, Labs, Radiology studies reviewed. meds and labs reviewed\    Got Lovenox therapeutic dose around 3 am    Cont asa/ lipitor/lisinopril- most are home med    Start lopressor 25 po bid  Hold Norvasc 5 mg po qd from home  echo    Need cardiac cath    The risk and benefit of left heart cath has been explain in detail including but not limited to  Bleeding including retroperitoneal bleed 1%, infection, MI, CVA, YUSRA, Limb loss, dissection, allergic reaction,death  Each of them 1 in 2000 range. The alternative managment has been explained. Patient expressed understanding of the risk and benefit and of the alternative managment well. Patient wanted and agreed to proceed with left heart cath.   Hence we will schedule him for [facility]    Based on the course and the result of the above test we will gauge further care    Thank you for allowing me to participate in the care of your patient.  Please don't hesitate to contact me regarding any further issues related to the patient care      Sin Baron MD

## 2022-02-04 NOTE — H&P
and wished to proceed; the consent form was signed. MEDICAL HISTORY  []ASHD/ANGINA/MI/CHF   []Hypertension  []Diabetes  []Hyperlipidemia  []Smoking  []Family Hx of ASHD  []Additional information:       has a past medical history of Depression, Diabetes mellitus ([address] Hyperlipidemia, Hypertension, Restless leg, and Sleep apnea. SURGICAL HISTORY   has a past surgical history that includes joint replacement.   Additional information:       ALLERGIES   Allergies as of 02/04/2022    (No Known Allergies)     Additional information:       MEDICATIONS   Aspirin  [] 81 mg  [] 325 mg  [] None  Antiplatelet drug therapy use last 5 days  []No []Yes  Coumadin Use Last 5 Days []No []Yes  Other anticoagulant use last 5 days  []No []Yes    Current Facility-Administered Medications:     atorvastatin (LIPITOR) tablet 10 mg, 10 mg, Oral, Daily, Mj Samuels, DO, 10 mg at 02/04/22 0804    lisinopril (PRINIVIL;ZESTRIL) tablet 20 mg, 20 mg, Oral, Daily, Mj Samuels DO, 20 mg at 02/04/22 0804    PARoxetine (PAXIL) tablet 20 mg, 20 mg, Oral, QAM, Mj Samuels, , 20 mg at 02/04/22 0803    sodium chloride flush 0.9 % injection 10 mL, 10 mL, IntraVENous, 2 times per day, Mj Samuels DO, 10 mL at 02/04/22 0804    sodium chloride flush 0.9 % injection 10 mL, 10 mL, IntraVENous, PRN, Mj Samuels DO    0.9 % sodium chloride infusion, 25 mL, IntraVENous, PRN, Mj Samuels DO    ondansetron (ZOFRAN-ODT) disintegrating tablet 4 mg, 4 mg, Oral, Q8H PRN **OR** ondansetron (ZOFRAN) injection 4 mg, 4 mg, IntraVENous, Q6H PRN, Mj Samuels DO    polyethylene glycol (GLYCOLAX) packet 17 g, 17 g, Oral, Daily PRN, Mj Samuels,     acetaminophen (TYLENOL) tablet 650 mg, 650 mg, Oral, Q6H PRN **OR** acetaminophen (TYLENOL) suppository 650 mg, 650 mg, Rectal, Q6H PRN, Mj Arvind, DO    potassium chloride (KLOR-CON M) extended release tablet 40 mEq, 40 mEq, Oral, PRN **OR** potassium bicarb-citric acid (EFFER-K) effervescent tablet 40 mEq, 40 mEq, Oral, PRN **OR** potassium chloride 10 mEq/100 mL IVPB (Peripheral Line), 10 mEq, IntraVENous, PRN, Hernanhemina Wilber, DO    magnesium sulfate 2000 mg in 50 mL IVPB premix, 2,000 mg, IntraVENous, PRN, Wilhemina Wilber, DO    insulin lispro (HUMALOG) injection vial 0-12 Units, 0-12 Units, SubCUTAneous, TID WC, Manuelina Wilber, DO    insulin lispro (HUMALOG) injection vial 0-6 Units, 0-6 Units, SubCUTAneous, Nightly, Jungsik J Luis, DO    glucose (GLUTOSE) 40 % oral gel 15 g, 15 g, Oral, PRN, Wilhemina Wilber, DO    glucagon (rDNA) injection 1 mg, 1 mg, IntraMUSCular, PRN, Hernanhemina Wilber, DO    dextrose 5 % solution, 100 mL/hr, IntraVENous, PRN, Hernanhemina Wilber, DO    dextrose bolus (hypoglycemia) 10% 125 mL, 125 mL, IntraVENous, PRN, Hernanhemina Wilber, DO    aspirin EC tablet 325 mg, 325 mg, Oral, Daily, Wilhemina Wilber, DO, 325 mg at 02/04/22 0804    metoprolol tartrate (LOPRESSOR) tablet 25 mg, 25 mg, Oral, BID, Caron Flowers MD  Prior to Admission medications    Medication Sig Start Date End Date Taking?  Authorizing Provider   metFORMIN (GLUCOPHAGE-XR) 500 MG extended release tablet Take 1 tablet by mouth once daily with breakfast 1/18/22  Yes SISI Calvert CNP   amLODIPine (NORVASC) 5 MG tablet Take 1 tablet by mouth daily 12/6/21  Yes SISI Agarwal CNP   lisinopril (PRINIVIL;ZESTRIL) 20 MG tablet Take 1 tablet by mouth daily 12/6/21  Yes SISI Calvert CNP   PARoxetine (PAXIL) 20 MG tablet Take 1 tablet by mouth every morning 12/6/21  Yes SISI Agarwal CNP   atorvastatin (LIPITOR) 10 MG tablet Take 1 tablet by mouth daily 12/6/21  Yes SISI Calvert CNP   meloxicam (MOBIC) 15 MG tablet Take 15 mg by mouth daily  Patient not taking: Reported on 12/6/2021 6/10/21   Historical Provider, MD     Additional information:       VITAL SIGNS   Vitals:    02/04/22 0802   BP: 133/85   Pulse: 67   Resp: 18   Temp: 98 °F (36.7 °C)   SpO2: 92%       PHYSICAL:   General: No acute distress  HEENT:  Unremarkable for age  Neck: without increased JVD, carotid pulses 2+ bilaterally without bruits  Heart: RRR, S1 & S2 WNL. No murmurs   Lungs: Clear to auscultation    Abdomen: BS present, without HSM, masses, or tenderness    Extremities: without C,C,E.  Pulses 2+ bilaterally   Mental Status: Alert & Oriented        PLANNED PROCEDURE   [x]Cath  [x]PCI                []Pacemaker/AICD  []LUCIO             []Cardioversion []Peripheral angiography/PTA  []Other:      SEDATION  Planned agent:[x]Midazolam []Meperidine []Sublimaze []Morphine  []Diazepam  []Other:     ASA Classification:  []1 []2 [x]3 []4 []5   Class 1: A normal healthy patient  Class 2: Pt with mild to moderate systemic disease  Class 3: Severe systemic disease or disturbance  Class 4: Severe systemic disorders that are already life threatening. Class 5: Moribund pt with little chances of survival, for more than 24 hours. Mallampati I Airway Classification:   []1 []2 [x]3 []4     [x]Pre-procedure diagnostic studies complete and results available. Comment:    [x]Previous sedation/anesthesia experiences assessed. Comment:    [x]The patient is an appropriate candidate to undergo the planned procedure sedation and anesthesia. (Refer to nursing sedation/analgesia documentation record)  [x]Formulation and discussion of sedation/procedure plan, risks, and expectations with patient and/or responsible adult completed. [x]Patient examined immediately prior to the procedure.  (Refer to nursing sedation/analgesia documentation record)      Juan Rizzo MD, Carlee Tineo    Electronically signed 2/4/2022 at 11:03 AM

## 2022-02-04 NOTE — ED PROVIDER NOTES
[facility] Emergency Department    CHIEF COMPLAINT       Chief Complaint   Patient presents with    Chest Pain       Nurses Notes reviewed and I agree except as noted in the HPI. HISTORY OF PRESENT ILLNESS    Allen Cat is a 78 y.o. male who presents to the ED for evaluation of chest pain. Patient notes at around 6 PM he was walking upstairs to go to bed when he had severe substernal chest pain, described as someone punching him in the chest.  He notes he had similar chest pain approximately 10 years ago, at that time he had a stress test and an echocardiogram that was negative. He never went on to have cardiac cath. He notes he took aspirin at home, and in route was given nitro x3 which completely resolved his pain. He notes a history of hypertension, diabetes, hyperlipidemia. He notes 3 weeks ago he had a left total hip replacement. He notes he has been having increased bilateral knee arthritis, he notes this is chronic but worsened recently. He denies any swelling in his legs. He denies any radiation of the pain in his chest, denies nausea vomiting. HPI was provided by the patient. REVIEW OF SYSTEMS     Review of Systems   Constitutional: Negative for activity change, chills and fever. HENT: Negative for congestion, rhinorrhea and sore throat. Respiratory: Positive for shortness of breath. Negative for cough and chest tightness. Cardiovascular: Positive for chest pain. Negative for palpitations and leg swelling. Gastrointestinal: Negative for nausea and vomiting. Musculoskeletal: Positive for arthralgias. Negative for myalgias. Skin: Negative for color change. Allergic/Immunologic: Negative for immunocompromised state. Neurological: Negative for dizziness, weakness, light-headedness and headaches. Hematological: Does not bruise/bleed easily. Psychiatric/Behavioral: Negative for agitation, behavioral problems and confusion.         PAST MEDICAL HISTORY     Past Medical History:   Diagnosis Date    Depression     Diabetes mellitus ([facility] [address]     dx about 3 months ago    Hyperlipidemia     Hypertension     Restless leg     Sleep apnea        SURGICALHISTORY      has a past surgical history that includes joint replacement. CURRENT MEDICATIONS       Current Discharge Medication List      CONTINUE these medications which have NOT CHANGED    Details   metFORMIN (GLUCOPHAGE-XR) 500 MG extended release tablet Take 1 tablet by mouth once daily with breakfast  Qty: 30 tablet, Refills: 11    Associated Diagnoses: Type 2 diabetes mellitus without complication, without long-term current use of insulin ([facility])      amLODIPine (NORVASC) 5 MG tablet Take 1 tablet by mouth daily  Qty: 90 tablet, Refills: 3    Associated Diagnoses: Hypertension, unspecified type      lisinopril (PRINIVIL;ZESTRIL) 20 MG tablet Take 1 tablet by mouth daily  Qty: 90 tablet, Refills: 3    Comments: Due for wellness  Associated Diagnoses: Hypertension, unspecified type      PARoxetine (PAXIL) 20 MG tablet Take 1 tablet by mouth every morning  Qty: 90 tablet, Refills: 3    Comments: Due for wellness  Associated Diagnoses: Depression, unspecified depression type      atorvastatin (LIPITOR) 10 MG tablet Take 1 tablet by mouth daily  Qty: 90 tablet, Refills: 3      meloxicam (MOBIC) 15 MG tablet Take 15 mg by mouth daily             ALLERGIES     has No Known Allergies. FAMILY HISTORY     He indicated that the status of his mother is unknown. He indicated that the status of his father is unknown.   family history includes Cancer in his father and mother.     SOCIAL HISTORY       Social History     Socioeconomic History    Marital status:      Spouse name: Not on file    Number of children: Not on file    Years of education: Not on file    Highest education level: Not on file   Occupational History    Not on file   Tobacco Use    Smoking status: Never Smoker    Smokeless tobacco: Current User Types: Chew   Substance and Sexual Activity    Alcohol use: No    Drug use: Not Currently    Sexual activity: Not on file   Other Topics Concern    Not on file   Social History Narrative    Not on file     Social Determinants of Health     Financial Resource Strain:     Difficulty of Paying Living Expenses: Not on file   Food Insecurity:     Worried About Running Out of Food in the Last Year: Not on file    Noble of Food in the Last Year: Not on file   Transportation Needs:     Lack of Transportation (Medical): Not on file    Lack of Transportation (Non-Medical): Not on file   Physical Activity:     Days of Exercise per Week: Not on file    Minutes of Exercise per Session: Not on file   Stress:     Feeling of Stress : Not on file   Social Connections:     Frequency of Communication with Friends and Family: Not on file    Frequency of Social Gatherings with Friends and Family: Not on file    Attends Jain Services: Not on file    Active Member of [address] [place] [address] or Organizations: Not on file    Attends Club or Organization Meetings: Not on file    Marital Status: Not on file   Intimate Partner Violence:     Fear of Current or Ex-Partner: Not on file    Emotionally Abused: Not on file    Physically Abused: Not on file    Sexually Abused: Not on file   Housing Stability:     Unable to Pay for Housing in the Last Year: Not on file    Number of [place] in the Last Year: Not on file    Unstable Housing in the Last Year: Not on file       PHYSICAL EXAM     INITIAL VITALS:  height is 5' 11\" (1.803 m) and weight is 231 lb 3.2 oz (104.9 kg). His oral temperature is 98 °F (36.7 °C). His blood pressure is 143/85 (abnormal) and his pulse is 68. His respiration is 18 and oxygen saturation is 98%. Physical Exam  Vitals and nursing note reviewed. Constitutional:       Appearance: Normal appearance. He is well-developed. HENT:      Head: Normocephalic.       Right Ear: External ear normal.      Left Ear: External ear normal.      Nose: Nose normal.      Mouth/Throat:      Pharynx: Uvula midline. Eyes:      Conjunctiva/sclera: Conjunctivae normal.   Cardiovascular:      Rate and Rhythm: Normal rate and regular rhythm. Heart sounds: Normal heart sounds, S1 normal and S2 normal.   Pulmonary:      Effort: Pulmonary effort is normal. No respiratory distress. Breath sounds: Normal breath sounds. Chest:      Chest wall: No tenderness. Abdominal:      General: Bowel sounds are normal. There is no distension. Palpations: Abdomen is soft. Tenderness: There is no abdominal tenderness. Musculoskeletal:         General: Normal range of motion. Cervical back: Normal range of motion and neck supple. Skin:     General: Skin is warm and dry. Coloration: Skin is not pale. Findings: No erythema or rash. Neurological:      Mental Status: He is alert and oriented to person, place, and time. Psychiatric:         Behavior: Behavior normal.         Thought Content: Thought content normal.         Judgment: Judgment normal.         DIFFERENTIAL DIAGNOSIS:   ACS, angina, PE, GERD,    DIAGNOSTIC RESULTS     EKG: All EKG's are interpreted by the Emergency Department Physician who eithersigns or Co-signs this chart in the absence of a cardiologist.    EKG read and interpreted by myself with comparison to 12/29/2021 gives impression of normal sinus rhythm with heart rate of 74; interval 178; QRS 76;QTc 417; axis P-46, R-50, T-87. RADIOLOGY: non-plainfilm images(s) such as CT, Ultrasound and MRI are read by the radiologist.  Plain radiographic images are visualized and preliminarily interpreted by the emergency physician unless otherwise stated below. CTA CHEST W WO CONTRAST   Final Result   No acute findings. No pulmonary embolism. Nonemergent/incidental findings in the report.       This document has been electronically signed by: Kaleb Lin MD on    02/04/2022 04:46 AM      All CTs at this facility use dose modulation techniques and iterative    reconstructions, and/or weight-based dosing   when appropriate to reduce radiation to a low as reasonably achievable. XR CHEST (2 VW)   Final Result   No acute findings. This document has been electronically signed by: Travon Hammer MD on    02/04/2022 02:53 AM            LABS:   Labs Reviewed   CBC WITH AUTO DIFFERENTIAL - Abnormal; Notable for the following components:       Result Value    RBC 4.62 (*)     Segs Absolute 8.0 (*)     All other components within normal limits   TROPONIN - Abnormal; Notable for the following components:    Troponin T 0.042 (*)     All other components within normal limits   COMPREHENSIVE METABOLIC PANEL - Abnormal; Notable for the following components:    Glucose 184 (*)     Alkaline Phosphatase 139 (*)     All other components within normal limits   D-DIMER, QUANTITATIVE - Abnormal; Notable for the following components:    D-Dimer, Quant 1325.00 (*)     All other components within normal limits   RAPID INFLUENZA A/B ANTIGENS   COVID-19, RAPID   ANION GAP   OSMOLALITY   GLOMERULAR FILTRATION RATE, ESTIMATED   TROPONIN   TROPONIN   POCT GLUCOSE   POCT GLUCOSE   POCT GLUCOSE       EMERGENCY DEPARTMENT COURSE:   Vitals:    Vitals:    02/04/22 0159 02/04/22 0258 02/04/22 0310 02/04/22 0410   BP: 134/85 126/70  (!) 143/85   Pulse: 78 68 69 68   Resp: 18 14 15 18   Temp: 97.7 °F (36.5 °C)   98 °F (36.7 °C)   TempSrc:    Oral   SpO2: 96% 95% 97% 98%   Weight: 240 lb (108.9 kg)   231 lb 3.2 oz (104.9 kg)   Height: 5' 11\" (1.803 m)   5' 11\" (1.803 m)       MDM    Patient was seen and evaluated in the emergency department, patient appeared to be in no acute distress, vital signs reviewed, no significant findings noted. EKG reviewed, no significant findings noted, some slight ST depression in lead III. No STEMI identified. Physical exam completed, no significant findings noted.   Labs and imaging were ordered, noted elevated troponin and D-dimer, no other significant findings noted. Patient was treated with 1 dose of Lovenox, CT of the chest was ordered. I discussed my findings and plan of care with the patient he is agreeable with admission. I discussed the case with Dr. Moira Jiang who accepts the patient for admission.   Medications   amLODIPine (NORVASC) tablet 5 mg (has no administration in time range)   atorvastatin (LIPITOR) tablet 10 mg (has no administration in time range)   lisinopril (PRINIVIL;ZESTRIL) tablet 20 mg (has no administration in time range)   PARoxetine (PAXIL) tablet 20 mg (has no administration in time range)   sodium chloride flush 0.9 % injection 10 mL (has no administration in time range)   sodium chloride flush 0.9 % injection 10 mL (has no administration in time range)   0.9 % sodium chloride infusion (has no administration in time range)   enoxaparin (LOVENOX) injection 40 mg (has no administration in time range)   ondansetron (ZOFRAN-ODT) disintegrating tablet 4 mg (has no administration in time range)     Or   ondansetron (ZOFRAN) injection 4 mg (has no administration in time range)   polyethylene glycol (GLYCOLAX) packet 17 g (has no administration in time range)   acetaminophen (TYLENOL) tablet 650 mg (has no administration in time range)     Or   acetaminophen (TYLENOL) suppository 650 mg (has no administration in time range)   potassium chloride (KLOR-CON M) extended release tablet 40 mEq (has no administration in time range)     Or   potassium bicarb-citric acid (EFFER-K) effervescent tablet 40 mEq (has no administration in time range)     Or   potassium chloride 10 mEq/100 mL IVPB (Peripheral Line) (has no administration in time range)   magnesium sulfate 2000 mg in 50 mL IVPB premix (has no administration in time range)   insulin lispro (HUMALOG) injection vial 0-12 Units (has no administration in time range)   insulin lispro (HUMALOG) injection vial 0-6 Units (has no administration in time range)   glucose (GLUTOSE) 40 % oral gel 15 g (has no administration in time range)   glucagon (rDNA) injection 1 mg (has no administration in time range)   dextrose 5 % solution (has no administration in time range)   dextrose bolus (hypoglycemia) 10% 125 mL (has no administration in time range)   aspirin EC tablet 325 mg (has no administration in time range)   enoxaparin (LOVENOX) injection 105 mg (105 mg SubCUTAneous Given 2/4/22 0350)   iopamidol (ISOVUE-370) 76 % injection 80 mL (80 mLs IntraVENous Given 2/4/22 7162)       Patient was seenindependently by myself. The patient's final impression and disposition and plan was determined by myself. CRITICAL CARE:   None    CONSULTS:  Hospitalist    PROCEDURES:  None    FINAL IMPRESSION     1. Chest pain, unspecified type          DISPOSITION/PLAN   Patient admitted  PATIENT REFERREDTO:  No follow-up provider specified. DISCHARGE MEDICATIONS:  Current Discharge Medication List          (Please note that portions of this note were completed with a voice recognition program.  Efforts were made to edit the dictations but occasionally words are mis-transcribed.)      Provider:  I personally performed the services described in the documentation,reviewed and edited the documentation which was dictated to the scribe in my presence, and it accurately records my words and actions.     Woo Herrera CNP 02/04/22 5:54 AM    Alexis Herrera, SISI - CHRYSTAL        [employer], SISI - CNP  02/04/22 7843

## 2022-02-04 NOTE — PROCEDURES
[address] [place] [address]                            CARDIAC CATHETERIZATION    PATIENT NAME: Da Stephens                    :        1955  MED REC NO:   [id number]                           ROOM:       [id number]  ACCOUNT NO:   [de-identified]                           ADMIT DATE: 2022  PROVIDER:     Shirley Parikh MD    DATE OF PROCEDURE:  2022    INDICATIONS FOR PROCEDURE:  Non-ST-elevation myocardial infarction. PROCEDURES PERFORMED:  1. Left cardiac catheterization with selective coronary angiogram.  2.  Left ventriculogram.  3.  Successful PCI and stenting of the left anterior descending artery. DESCRIPTION OF PROCEDURE/INTERVENTION DETAILS:  After informed consent,  the patient was brought to the cardiac catheterization room. He was  prepped and draped in a sterile fashion. 2% lidocaine was injected in  the skin and subcutaneous tissue overlying the right radial artery. Under ultrasound guidance using modified Seldinger technique, I was able  to obtain access in the right radial artery. 5/6 Slender sheath was  inserted. Standard antithrombotic/antispasmodic medications were given. I used 6-Mongolian 3DRC to engage the right coronary artery. 6-Mongolian EBU  3.5 guide catheter was used to cannulate the left main coronary artery. Heparin was given and ACT was confirmed to be above 250. I used  Runthrough wire to cross the lesion and wire the left anterior  descending artery. I then proceeded with predilation using a 2.5 mm x  12 mm PTCA balloon. Afterwards, I proceeded with stenting. Resolute  Peyman 3.5 x 22 mm drug-eluting stent was successfully deployed. This was  postdilated using a 3.5 mm x 8 mm noncompliant balloon. The wire was  removed. Repeat angiogram revealed well expanded stents, 0% residual  stenosis.   No complications including no dissection, distal embolization  or perforation. FINDINGS:  HEMODYNAMICS:  Left ventricular end-diastolic pressure was 6 mmHg. No  significant pressure gradient across the aortic valve upon pullback. Ejection fraction 50% to 55%. CORONARY ANGIOGRAM:  1. RIGHT CORONARY ARTERY:  Right coronary artery has significant  tortuosity in the proximal and mid segment. Proximal RCA has luminal  irregularities. Mid RCA has 10% to 20% mildly calcified lesions. Distal RCA with luminal irregularities. Codominant vessel. RPDA has  mild diffuse disease. 2.  LEFT MAIN CORONARY ARTERY:  Left main coronary artery is patent  without significant stenotic lesions. It bifurcates into left  circumflex and left anterior descending arteries. 3.  LEFT CIRCUMFLEX ARTERY:  Large, dominant vessel. Proximal LCX is  patent. Distal left circumflex artery into the LPL has 50% stenosis. 4.  LEFT ANTERIOR DESCENDING ARTERY:  Proximal LAD has a 95% severe  stenosis with findings consistent with probable ulcerated lesion. Mid  LAD has a 30% to 40% stenosis, nonobstructive. Distal LAD has mild  luminal irregularities. MEDICATIONS:  See MAR. COMPLICATIONS:  None. ESTIMATED BLOOD LOSS:  Less than 50 mL. ACCESS:  Right radial artery access. Vasc Band was applied. Hemostasis  was achieved. IMPRESSION:  1.  Non-ST-elevation myocardial infarction. 2.  Severe 95% stenosis of the proximal segment of left anterior  descending artery, status post successful PCI and stenting. RECOMMENDATIONS:  Continue to monitor on telemetry. Dual antiplatelet  therapy, high intensity statin therapy. Aggressive risk factor  modification. Obtain an echocardiogram.  IV fluids. Repeat labs in the  morning. Outpatient followup with Cardiology.         Nanci Machado MD    D: 02/04/2022 12:28:16       T: 02/04/2022 12:32:17     [email or web address]  Job#: [id number]     Doc#: [id number]    CC:

## 2022-02-05 VITALS
BODY MASS INDEX: 32.37 KG/M2 | TEMPERATURE: 98.1 F | WEIGHT: 231.2 LBS | SYSTOLIC BLOOD PRESSURE: 115 MMHG | HEIGHT: 71 IN | RESPIRATION RATE: 18 BRPM | HEART RATE: 60 BPM | DIASTOLIC BLOOD PRESSURE: 69 MMHG | OXYGEN SATURATION: 98 %

## 2022-02-05 LAB
ALBUMIN SERPL-MCNC: 3.9 G/DL (ref 3.5–5.1)
ALP BLD-CCNC: 132 U/L (ref 38–126)
ALT SERPL-CCNC: 25 U/L (ref 11–66)
ANION GAP SERPL CALCULATED.3IONS-SCNC: 12 MEQ/L (ref 8–16)
AST SERPL-CCNC: 50 U/L (ref 5–40)
BILIRUB SERPL-MCNC: 0.6 MG/DL (ref 0.3–1.2)
BUN BLDV-MCNC: 16 MG/DL (ref 7–22)
CALCIUM SERPL-MCNC: 8.9 MG/DL (ref 8.5–10.5)
CHLORIDE BLD-SCNC: 103 MEQ/L (ref 98–111)
CO2: 22 MEQ/L (ref 23–33)
CREAT SERPL-MCNC: 0.8 MG/DL (ref 0.4–1.2)
EKG ATRIAL RATE: 74 BPM
EKG P AXIS: 46 DEGREES
EKG P-R INTERVAL: 178 MS
EKG Q-T INTERVAL: 376 MS
EKG QRS DURATION: 76 MS
EKG QTC CALCULATION (BAZETT): 417 MS
EKG R AXIS: 50 DEGREES
EKG T AXIS: 87 DEGREES
EKG VENTRICULAR RATE: 74 BPM
ERYTHROCYTE [DISTWIDTH] IN BLOOD BY AUTOMATED COUNT: 12.9 % (ref 11.5–14.5)
ERYTHROCYTE [DISTWIDTH] IN BLOOD BY AUTOMATED COUNT: 42.8 FL (ref 35–45)
GFR SERPL CREATININE-BSD FRML MDRD: > 90 ML/MIN/1.73M2
GLUCOSE BLD-MCNC: 133 MG/DL (ref 70–108)
GLUCOSE BLD-MCNC: 141 MG/DL (ref 70–108)
HCT VFR BLD CALC: 41.6 % (ref 42–52)
HEMOGLOBIN: 13.7 GM/DL (ref 14–18)
MCH RBC QN AUTO: 30.2 PG (ref 26–33)
MCHC RBC AUTO-ENTMCNC: 32.9 GM/DL (ref 32.2–35.5)
MCV RBC AUTO: 91.6 FL (ref 80–94)
PLATELET # BLD: 229 THOU/MM3 (ref 130–400)
PMV BLD AUTO: 10.2 FL (ref 9.4–12.4)
POTASSIUM REFLEX MAGNESIUM: 4.6 MEQ/L (ref 3.5–5.2)
RBC # BLD: 4.54 MILL/MM3 (ref 4.7–6.1)
SODIUM BLD-SCNC: 137 MEQ/L (ref 135–145)
TOTAL PROTEIN: 6.5 G/DL (ref 6.1–8)
WBC # BLD: 6.3 THOU/MM3 (ref 4.8–10.8)

## 2022-02-05 PROCEDURE — G0378 HOSPITAL OBSERVATION PER HR: HCPCS

## 2022-02-05 PROCEDURE — 82948 REAGENT STRIP/BLOOD GLUCOSE: CPT

## 2022-02-05 PROCEDURE — 99217 PR OBSERVATION CARE DISCHARGE MANAGEMENT: CPT | Performed by: FAMILY MEDICINE

## 2022-02-05 PROCEDURE — 99214 OFFICE O/P EST MOD 30 MIN: CPT | Performed by: STUDENT IN AN ORGANIZED HEALTH CARE EDUCATION/TRAINING PROGRAM

## 2022-02-05 PROCEDURE — 6370000000 HC RX 637 (ALT 250 FOR IP): Performed by: INTERNAL MEDICINE

## 2022-02-05 PROCEDURE — 6370000000 HC RX 637 (ALT 250 FOR IP): Performed by: HOSPITALIST

## 2022-02-05 PROCEDURE — 6370000000 HC RX 637 (ALT 250 FOR IP): Performed by: STUDENT IN AN ORGANIZED HEALTH CARE EDUCATION/TRAINING PROGRAM

## 2022-02-05 PROCEDURE — 85027 COMPLETE CBC AUTOMATED: CPT

## 2022-02-05 PROCEDURE — 80053 COMPREHEN METABOLIC PANEL: CPT

## 2022-02-05 PROCEDURE — 36415 COLL VENOUS BLD VENIPUNCTURE: CPT

## 2022-02-05 RX ORDER — ASPIRIN 81 MG/1
81 TABLET ORAL DAILY
Qty: 30 TABLET | Refills: 3 | Status: SHIPPED | OUTPATIENT
Start: 2022-02-05

## 2022-02-05 RX ORDER — LISINOPRIL 10 MG/1
10 TABLET ORAL DAILY
Status: DISCONTINUED | OUTPATIENT
Start: 2022-02-05 | End: 2022-02-05 | Stop reason: HOSPADM

## 2022-02-05 RX ORDER — ROSUVASTATIN CALCIUM 40 MG/1
40 TABLET, COATED ORAL NIGHTLY
Qty: 30 TABLET | Refills: 0 | Status: SHIPPED | OUTPATIENT
Start: 2022-02-05 | End: 2022-03-24 | Stop reason: SDUPTHER

## 2022-02-05 RX ORDER — LISINOPRIL 10 MG/1
10 TABLET ORAL DAILY
Qty: 30 TABLET | Refills: 3 | Status: SHIPPED | OUTPATIENT
Start: 2022-02-05 | End: 2022-03-24 | Stop reason: SDUPTHER

## 2022-02-05 RX ADMIN — LISINOPRIL 10 MG: 10 TABLET ORAL at 09:28

## 2022-02-05 RX ADMIN — METOPROLOL TARTRATE 25 MG: 25 TABLET, FILM COATED ORAL at 09:28

## 2022-02-05 RX ADMIN — ASPIRIN 81 MG: 81 TABLET, COATED ORAL at 09:28

## 2022-02-05 RX ADMIN — INSULIN LISPRO 1 UNITS: 100 INJECTION, SOLUTION INTRAVENOUS; SUBCUTANEOUS at 09:28

## 2022-02-05 RX ADMIN — PAROXETINE HYDROCHLORIDE 20 MG: 20 TABLET, FILM COATED ORAL at 09:28

## 2022-02-05 RX ADMIN — TICAGRELOR 90 MG: 90 TABLET ORAL at 09:28

## 2022-02-05 RX ADMIN — TICAGRELOR 90 MG: 90 TABLET ORAL at 00:35

## 2022-02-05 NOTE — DISCHARGE SUMMARY
DISCHARGE SUMMARY      Patient Identification:   Winsome Lozada   : 1955  MRN: [id number]   Account: [de-identified]      Patient's PCP: SISI Garg CNP    Admit Date: 2022     Discharge Date: 2022    Admitting Physician: Walt Hughes DO     Discharge Physician: Alda Ya MD     Discharge Diagnoses: Active Hospital Problems    Diagnosis Date Noted    Chest pain [R07.9] 2022       The patient was seen and examined on day of discharge and this discharge summary is in conjunction with any daily progress note from day of discharge. Hospital Course:   Winsome Lozada is a 78 y.o. male admitted to [address] [place] [address] on 2022 for NSTEMI     Symptoms began late on 2/3 with severe chest pain. The chest pain persisted for several minutes with no resolution and hence called 911 And EMS brought him to ER. In the squad pt was give 3 doses of NTG SL and the chest pain resolved with that. No chest pain then after throughout admission. No previous heart history, has had normal stress/echo 10 years ago. Also had recent hip replacement. In ED,  Initial troponin only slightly elevated and EKG showed no STEMI. Second troponin was elevated and so Dr. Susie Burgos brought patient to cath lab. Found to have 95% blockage of LAD, which was stented. Did well after procedure. Blood pressures slightly soft after admission so stopped amlodipine and halved lisinopril to 10mg. Echo completed and showed preserved EF. Not given entresto. Did change 10mg lipitor to 40mg crestor. Also began on metoprolol 25mg BID. Started on DAPT (brilinta). To have followup with cardiology in 3 weeks and PCP in 3 days. Will begin cardiac rehab in 4 weeks.           Exam:     Vitals:  Vitals:    22 2000 22 0034 22 0406 22 0807   BP: 108/69 108/76 103/70 115/69   Pulse: 64 57 61 60   Resp: 18 16 18 18   Temp: 98.1 °F (36.7 °C) 98.8 °F (37.1 °C) 98.6 °F (37 °C) 98.1 °F (36.7 °C)   TempSrc: Oral  Oral Oral   SpO2: 95% 96% 97% 98%   Weight:       Height:         Weight: Weight: 231 lb 3.2 oz (104.9 kg)     24 hour intake/output:    Intake/Output Summary (Last 24 hours) at 2/5/2022 1351  Last data filed at 2/4/2022 1500  Gross per 24 hour   Intake --   Output 400 ml   Net -400 ml         Physical Exam  Vitals and nursing note reviewed. Constitutional:       Appearance: Normal appearance. HENT:      Head: Normocephalic and atraumatic. Nose: Nose normal.      Mouth/Throat:      Mouth: Mucous membranes are moist.      Pharynx: Oropharynx is clear. Eyes:      Extraocular Movements: Extraocular movements intact. Pupils: Pupils are equal, round, and reactive to light. Cardiovascular:      Rate and Rhythm: Normal rate and regular rhythm. Pulses: Normal pulses. Heart sounds: Normal heart sounds. Pulmonary:      Effort: Pulmonary effort is normal.      Breath sounds: Normal breath sounds. Abdominal:      General: Abdomen is flat. Bowel sounds are normal.   Musculoskeletal:         General: No signs of injury. Normal range of motion. Cervical back: Normal range of motion and neck supple. Skin:     General: Skin is warm and dry. Capillary Refill: Capillary refill takes less than 2 seconds. Neurological:      General: No focal deficit present. Mental Status: He is alert and oriented to person, place, and time. Mental status is at baseline. Psychiatric:         Mood and Affect: Mood normal.         Behavior: Behavior normal.           Labs:  For convenience and continuity at follow-up the following most recent labs are provided:      CBC:    Lab Results   Component Value Date    WBC 6.3 02/05/2022    HGB 13.7 02/05/2022    HCT 41.6 02/05/2022     02/05/2022       Renal:    Lab Results   Component Value Date     02/05/2022    K 4.6 02/05/2022     02/05/2022    CO2 22 02/05/2022    BUN 16 02/05/2022    CREATININE 0.8 02/05/2022 CALCIUM 8.9 02/05/2022         Significant Diagnostic Studies    Radiology:   CTA CHEST W WO CONTRAST   Final Result   No acute findings. No pulmonary embolism. Nonemergent/incidental findings in the report. This document has been electronically signed by: Floridalma Humphrey MD on    02/04/2022 04:46 AM      All CTs at this facility use dose modulation techniques and iterative    reconstructions, and/or weight-based dosing   when appropriate to reduce radiation to a low as reasonably achievable. XR CHEST (2 VW)   Final Result   No acute findings.       This document has been electronically signed by: Floridalma Humphrey MD on    02/04/2022 02:53 AM             Consults:     [address] TO SLEEP MEDICINE    Disposition:    [x] Home       [] TCU       [] Rehab       [] Psych       [] SNF       [] [facility]       [] Other-    Condition at Discharge: Stable    Code Status:  Prior     Patient Instructions:    Discharge lab work: None  Activity: no lifting, Driving, or Strenuous exercise for 1 month  Diet: No diet orders on file      Follow-up visits:   Ursula Bañuelos, SISI - CNP  [address] [address]  [phone]    In 3 days      Rosalina Hough MD  [facility], [address] [place] [address] Drive  [address] [address]  [phone]    In 3 weeks           Discharge Medications:        Medication List      START taking these medications    aspirin 81 MG EC tablet  Take 1 tablet by mouth daily     metoprolol tartrate 25 MG tablet  Commonly known as: LOPRESSOR  Take 1 tablet by mouth 2 times daily     rosuvastatin 40 MG tablet  Commonly known as: CRESTOR  Take 1 tablet by mouth nightly     ticagrelor 90 MG Tabs tablet  Commonly known as: BRILINTA  Take 1 tablet by mouth 2 times daily        CHANGE how you take these medications    lisinopril 10 MG tablet  Commonly known as: PRINIVIL;ZESTRIL  Take 1 tablet by mouth daily  What changed:   · medication strength  · how much to take

## 2022-02-05 NOTE — CARE COORDINATION
2/5/22, 1:19 PM EST      Home with spouse; has DME. Declined needs. Patient goals/plan/ treatment preferences discussed by  and . Patient goals/plan/ treatment preferences reviewed with patient/ family. Patient/ family verbalize understanding of discharge plan and are in agreement with goal/plan/treatment preferences. Understanding was demonstrated using the teach back method. AVS provided by RN at time of discharge, which includes all necessary medical information pertaining to the patients current course of illness, treatment, post-discharge goals of care, and treatment preferences.         IMM Letter  Observation Status Letter date given[de-identified] 02/04/22  Observation Status Letter time given[de-identified] 4327  Observation Status Letter given to Patient/Family/Significant other/Guardian/POA/by[de-identified] Pt Access

## 2022-02-05 NOTE — PROGRESS NOTES
Cardiology Progress Note      Patient:  Casey Martinez  YOB: 1955  MRN: [id number]   Acct: [de-identified]  Admit Date:  2/4/2022  Primary Cardiologist: none   Per Dr lee's note:  \"REason Of consultation-  Cp and elevated troponin     Primary cardiologist- none     HPI   Mr. Francisco Lobo O 44 y. o. male who presents to the ED for evaluation of chest pain that started  around 11 PM he was walking upstairs to go to bed. The chest pain was severe substernal chest pain, 8/10, pressure and  Sharp, non radiating no associated symptom of sob or diaphoresis. The chest pain persisted for several minutes with no resolution and hence call 911 And EMS brought him to ER. In the squad pat was give 3 doses of NTG SL and the chest pain resolved with that. No chest pain then after.      Hx of sob on exertion and no chest prior in the last several years. He notes he had similar chest pain approximately 10 years ago, at that time he had a stress test and an echocardiogram that was negative.  He never went on to have cardiac cath.     He notes a history of hypertension, diabetes, hyperlipidemia.    He notes 3 weeks ago he had a left total hip replacement.  He notes he has been having increased bilateral knee arthritis, he notes this is chronic but worsened recently. Alana Pamela denies any swelling in his legs.    He denies nausea vomiting.     Currently cp free  And Lovenox around 3 am\"      Subjective (Events in last 24 hours):   Pt awake, alert. NAD. Denies CP, SOB. D/w patient about importance of asa/brilinta for new heart stents. Mentioned cardiac rehab. Statin/bb/ace for cholesterol and heart. Will f/u in 2-3 weeks in office. Made a few med adjustments, discharge paperwork will show all changes. S/p LHC with PCI to LAD  Creat stable. H/H stable.      Objective:   /69   Pulse 60   Temp 98.1 °F (36.7 °C) (Oral)   Resp 18   Ht 5' 11\" (1.803 m)   Wt 231 lb 3.2 oz (104.9 kg)   SpO2 98%   BMI 32.25 kg/m² vss  TELEMETRY: sinus, 50s     Physical Exam:  General Appearance: alert and oriented to person, place and time, in no acute distress  Cardiovascular: normal rate, regular rhythm, normal S1 and S2, no murmurs, rubs, clicks, or gallops, distal pulses intact, no carotid bruits, no JVD  Pulmonary/Chest: clear to auscultation bilaterally- no wheezes, rales or rhonchi, normal air movement, no respiratory distress  Abdomen: soft, non-tender, non-distended, normal bowel sounds, no masses   Extremities: no cyanosis, clubbing or edema, pulse   Skin: warm and dry, no rash or erythema  Neurological: alert, oriented, normal speech, no focal findings or movement disorder noted    Medications:    lisinopril  20 mg Oral Daily    PARoxetine  20 mg Oral QAM    insulin lispro  0-12 Units SubCUTAneous TID WC    insulin lispro  0-6 Units SubCUTAneous Nightly    metoprolol tartrate  25 mg Oral BID    sodium chloride flush  5-40 mL IntraVENous 2 times per day    rosuvastatin  40 mg Oral Nightly    aspirin  81 mg Oral Daily    ticagrelor  90 mg Oral BID      dextrose      sodium chloride       ondansetron, 4 mg, Q8H PRN   Or  ondansetron, 4 mg, Q6H PRN  polyethylene glycol, 17 g, Daily PRN  acetaminophen, 650 mg, Q6H PRN  potassium chloride, 40 mEq, PRN   Or  potassium alternative oral replacement, 40 mEq, PRN   Or  potassium chloride, 10 mEq, PRN  magnesium sulfate, 2,000 mg, PRN  glucose, 15 g, PRN  glucagon (rDNA), 1 mg, PRN  dextrose, 100 mL/hr, PRN  dextrose bolus (hypoglycemia), 125 mL, PRN  sodium chloride flush, 5-40 mL, PRN  sodium chloride, 25 mL, PRN  acetaminophen, 650 mg, Q4H PRN        Diagnostics:  EKG:     Echo:   Conclusions      Summary   Normal left ventricle size and systolic function. Ejection fraction was   estimated at 55 %. There were no regional left ventricular wall motion   abnormalities and wall thickness was within normal limits. The left atrium is Mildly dilated.       Signature ----------------------------------------------------------------   Electronically signed by Mary Waite MD (Interpreting   physician) on 02/04/2022  Stress:     Left Heart Cath:   FINDINGS:  HEMODYNAMICS:  Left ventricular end-diastolic pressure was 6 mmHg. No  significant pressure gradient across the aortic valve upon pullback.     Ejection fraction 50% to 55%.     CORONARY ANGIOGRAM:  1. RIGHT CORONARY ARTERY:  Right coronary artery has significant  tortuosity in the proximal and mid segment. Proximal RCA has luminal  irregularities. Mid RCA has 10% to 20% mildly calcified lesions. Distal RCA with luminal irregularities. Codominant vessel. RPDA has  mild diffuse disease. 2.  LEFT MAIN CORONARY ARTERY:  Left main coronary artery is patent  without significant stenotic lesions. It bifurcates into left  circumflex and left anterior descending arteries. 3.  LEFT CIRCUMFLEX ARTERY:  Large, dominant vessel. Proximal LCX is  patent. Distal left circumflex artery into the LPL has 50% stenosis. 4.  LEFT ANTERIOR DESCENDING ARTERY:  Proximal LAD has a 95% severe  stenosis with findings consistent with probable ulcerated lesion. Mid  LAD has a 30% to 40% stenosis, nonobstructive. Distal LAD has mild  luminal irregularities.     MEDICATIONS:  See MAR.     COMPLICATIONS:  None.     ESTIMATED BLOOD LOSS:  Less than 50 mL.     ACCESS:  Right radial artery access. Vasc Band was applied. Hemostasis  was achieved.     IMPRESSION:  1.  Non-ST-elevation myocardial infarction. 2.  Severe 95% stenosis of the proximal segment of left anterior  descending artery, status post successful PCI and stenting.     RECOMMENDATIONS:  Continue to monitor on telemetry. Dual antiplatelet  therapy, high intensity statin therapy. Aggressive risk factor  modification. Obtain an echocardiogram.  IV fluids. Repeat labs in the  morning.   Outpatient followup with Cardiology.  Ethan Greene MD     D: 02/04/2022  Lab Data:    Cardiac Enzymes:  No results for input(s): CKTOTAL, CKMB, CKMBINDEX, TROPONINI in the last 72 hours. CBC:   Lab Results   Component Value Date    WBC 6.3 02/05/2022    RBC 4.54 02/05/2022    RBC 5.13 12/29/2021    HGB 13.7 02/05/2022    HCT 41.6 02/05/2022     02/05/2022       CMP:    Lab Results   Component Value Date     02/05/2022    K 4.6 02/05/2022     02/05/2022    CO2 22 02/05/2022    BUN 16 02/05/2022    CREATININE 0.8 02/05/2022    LABGLOM >90 02/05/2022    GLUCOSE 133 02/05/2022    GLUCOSE 150 12/29/2021    CALCIUM 8.9 02/05/2022       Hepatic Function Panel:    Lab Results   Component Value Date    ALKPHOS 132 02/05/2022    ALT 25 02/05/2022    AST 50 02/05/2022    PROT 6.5 02/05/2022    BILITOT 0.6 02/05/2022    BILIDIR 0.1 08/02/2021    LABALBU 3.9 02/05/2022       Magnesium:  No results found for: MG    PT/INR:    Lab Results   Component Value Date    PROTIME 11.2 01/06/2022    INR 1.0 01/06/2022       HgBA1c:    Lab Results   Component Value Date    LABA1C 6.8 12/29/2021       FLP:    Lab Results   Component Value Date    TRIG 172 12/29/2021    HDL 42 12/29/2021    LDLCALC 84 12/29/2021    LABVLDL 34 12/29/2021       TSH:  No results found for: TSH      Assessment:  Chest pain/sob- s/p C   NSTEMI s/p [facility]  CAD s/p PCI to LAD  HTN-on lower side   HLD-total 160, trigs 172, LDL 84   DM2- per attending. EF 55%. Plan:  ACS Guidelines  ASA/Plavix/brilinta-yes, ASA/brilinta  Statin- Yes. crestor 40 mg daily. BB-yes. Lopressor 25 mg BID. ACE/ARB-yes. Lisinopril 10 mg daily. Repatha-? Consider if indicated. Cardiac Rehab-ordered. SL nitro 0.4 mg PRN at discharge. Patient is stable from cardiology standpoint for discharge. F/u with Dr Janine Mata in 2-3 weeks.       Electronically signed by Jose Alberto Morrison PA-C on 2/5/2022 at 8:11 AM

## 2022-02-05 NOTE — PROGRESS NOTES
AVS discussed with patient and spouse. Patient given MI folder, angioplasty & stent booklet, and stent card. Instructed to make follow up with PCP and Dr. Mayur Choi. Educated that medications have been sent to [facility]. Patient discharged in stable condition per wheelchair with transport.

## 2022-02-05 NOTE — PLAN OF CARE
Problem: Cardiac Output - Decreased:  Goal: Hemodynamic stability will improve  Description: Hemodynamic stability will improve  Outcome: Ongoing  Note: Ongoing assessment & interventions provided throughout shift. Patient on continuous telemetry monitoring, heart tones, vitals & pulses checked at least 3 times per shift & PRN. Vitals within acceptable limits. Peripheral pulses palpable. Problem: Serum Glucose Level - Abnormal:  Goal: Ability to maintain appropriate glucose levels will improve  Description: Ability to maintain appropriate glucose levels will improve  Outcome: Ongoing  Note: Continue to monitor glucose levels per routine. Problem: Tissue Perfusion - Cardiopulmonary, Altered:  Goal: Absence of angina  Description: Absence of angina  Outcome: Ongoing     Problem: Safety:  Goal: Free from accidental physical injury  Description: Free from accidental physical injury  Outcome: Ongoing  Note: Continue to monitor for fall risk      Problem: Pain:  Goal: Patient's pain/discomfort is manageable  Description: Patient's pain/discomfort is manageable  Outcome: Ongoing  Note: Patient denies pain so far this shift. Reminded patient to report any pain, pressure, or shortness of breath to the nurse. Will continue to monitor. Problem: Skin Integrity:  Goal: Skin integrity will stabilize  Description: Skin integrity will stabilize  Outcome: Ongoing  Note: Continue to monitor skin for any signs of break down      Care plan reviewed with patient. Patient verbalizes understanding of the care plan and contributed to goal setting.

## 2022-02-07 ENCOUNTER — CARE COORDINATION (OUTPATIENT)
Dept: CASE MANAGEMENT | Age: 67
End: 2022-02-07

## 2022-02-07 NOTE — PROGRESS NOTES
Inpatient Cardiac Rehabilitation Consult    Did not receive consult for Phase II Cardiac Rehabilitation. Pt had PCI on 2/4/2022.

## 2022-02-07 NOTE — CARE COORDINATION
Care Transitions Outreach Attempt    Call within 2 business days of discharge: Yes   Patient: Geovanna Keene Patient : 1955 MRN: <[id number]>  Facility: [address] [place] [address]       Complaint Diagnosis Description Type Department Provider    22 Chest Pain Chest pain, unspecified type ED to Hosp-Admission (Discharged) (ADMITTED) JOSE 3B Rhoda Carmona MD; Little Sanchez. .. Noted following upcoming appointments from discharge chart review:   [facility] follow up appointment(s): No future appointments. 1st attempt to reach for Care Transition discharge call unsuccessful. HIPAA compliant message left requesting call back.    [address] [place] [address], [facility] [phone]

## 2022-02-08 ENCOUNTER — CARE COORDINATION (OUTPATIENT)
Dept: CASE MANAGEMENT | Age: 67
End: 2022-02-08

## 2022-02-08 DIAGNOSIS — I20.8 STABLE ANGINA PECTORIS (HCC): Primary | ICD-10-CM

## 2022-02-08 PROCEDURE — 1111F DSCHRG MED/CURRENT MED MERGE: CPT | Performed by: FAMILY MEDICINE

## 2022-02-08 NOTE — CARE COORDINATION
[facility] Transitions Initial Follow Up Call    Call within 2 business days of discharge: Yes    Patient: Larose Schaumann Patient : 1955   MRN: [id number]  Reason for Admission: Chest pain  Discharge Date: 22 RARS: No data recorded    Last Discharge [facility]       Complaint Diagnosis Description Type Department Provider    22 Chest Pain Chest pain, unspecified type ED to Hosp-Admission (Discharged) (ADMITTED) JOSE 3B Holger Hobbs MD; Ferdinand Reyes. .. Spoke with Reshma Kenyon, said he is feeling pretty good, taking it easy for a few days. Denies chest pain/palpitations, SOB. Cath site without s/s of infection. Reviewed medications, taking as directed, no questions. Appetite and fluid intake is good. Has not made f/u appts yet, agreed with assistance, sent to [facility]. Denies any needs. No other questions or concerns at this time. Will continue to follow. Non-face-to-face services provided:  Scheduled appointment with PCP-sent to [facility]  Scheduled appointment with Specialist-sent to   Obtained and reviewed discharge summary and/or continuity of care documents    Care Transitions 24 Hour Call    Do you have any ongoing symptoms?: No  Do you have a copy of your discharge instructions?: Yes  Do you have all of your prescriptions and are they filled?: Yes  Have you been contacted by a [facility] Pharmacist?: No  Have you scheduled your follow up appointment?: No  Were you discharged with any Home Care or Post Acute Services: No  Do you feel like you have everything you need to keep you well at home?: Yes  Care Transitions Interventions     Transitions of Care Initial Call    Challenges to be reviewed by the provider   Additional needs identified to be addressed with provider: No  none             Method of communication with provider : none      Advance Care Planning:   Does patient have an Advance Directive: reviewed and current. Was this a readmission?  No  Patient stated reason for admission: chest pain  Patients top risk factors for readmission: lack of knowledge about disease  medical condition-chest pain    Care Transition Nurse (CTN) contacted the patient by telephone to perform post hospital discharge assessment. Verified name and  with patient as identifiers. Provided introduction to self, and explanation of the CTN role. CTN reviewed discharge instructions, medical action plan and red flags with patient who verbalized understanding. Patient given an opportunity to ask questions and does not have any further questions or concerns at this time. Were discharge instructions available to patient? Yes. Reviewed appropriate site of care based on symptoms and resources available to patient including: PCP and Specialist. The patient agrees to contact the PCP office for questions related to their healthcare. Medication reconciliation was performed with patient, who verbalizes understanding of administration of home medications. Advised obtaining a 90-day supply of all daily and as-needed medications. Covid Risk Education     Educated patient about risk for severe COVID-19 due to risk factors according to CDC guidelines. CTN reviewed discharge instructions, medical action plan and red flag symptoms with the patient who verbalized understanding. Discussed COVID vaccination status: Yes. Education provided on COVID-19 vaccination as appropriate. Discussed exposure protocols and quarantine with CDC Guidelines. Patient was given an opportunity to verbalize any questions and concerns and agrees to contact CTN or health care provider for questions related to their healthcare. Reviewed and educated patient on any new and changed medications related to discharge diagnosis. Was patient discharged with a pulse oximeter? No     CTN provided contact information. Plan for follow-up call in 5-7 days based on severity of symptoms and risk factors.   Plan for next call: symptom

## 2022-02-09 ENCOUNTER — CARE COORDINATION (OUTPATIENT)
Dept: CASE MANAGEMENT | Age: 67
End: 2022-02-09

## 2022-02-09 NOTE — CARE COORDINATION
Request from 73 Radha Bro RN., please schedule patient for hospital f/u    Patient scheduled with Juancho Serrano NP 2/11@ 10:45AM    Cardiology 3/1 @ 3pm    LVM with appt date and time and address of cardiology.    Also left this writers name and number for call back for questions concerning appt      Vj Cox, [address] [place] [address] Coordination Transition

## 2022-02-15 ENCOUNTER — CARE COORDINATION (OUTPATIENT)
Dept: CASE MANAGEMENT | Age: 67
End: 2022-02-15

## 2022-02-15 NOTE — CARE COORDINATION
[facility] Transitions Follow Up Call    2/15/2022    Patient: Casey Martinez  Patient : 1955   MRN: <[id number]>  Reason for Admission:  NSTEMI s/p PCI of LAD  Discharge Date: 22 RARS: No data recorded  Facility: [facility] Transitions Subsequent and Final Call    Schedule Follow Up Appointment with PCP: Declined  Subsequent and Final Calls  Do you have any ongoing symptoms?: No  Have your medications changed?: No  Do you have any questions related to your medications?: No  Do you currently have any active services?: No  Do you have any needs or concerns that I can assist you with?: No  Identified Barriers: Other  Care Transitions Interventions  No Identified Needs  Other Interventions:         Future Appointments   Date Time Provider Kvng Suarez   3/1/2022  3:00 PM Rosalie Fenton MD [facility] [place]   2022  9:00  E Javier, APRN - CNP [facility] w/ Patient for follow up call. Reports doing very well. Denies chest pain/pressure, palps, sob, orthopnea. Reports right wrist cath insertion site wnl. Advised heart healthy diet w/ low na, low carb. Does not routinely check BP. Has cuff, advised routine BP monitoring. Reports taking all meds as directed. Denies rx needs. Was seen by Harvey Posada NP on ; no rx changes. Aware of 3/1/22 cardiology appt. Denies resource needs. Denies need for ongoing CT follow up as able to manage own healthcare needs. Advised Covid 19 preventative measures including social distancing, mask covering nose/mouth, hand washing. Advised to contact PCP/Cardiologist  re: any health concerns for early outpt intervention in effort to avoid ED or hospitalization. Discussed benefits of Urgent Care. Episode closed, no further outreach scheduled.   Andre De Los Santos RN

## 2022-03-24 ENCOUNTER — OFFICE VISIT (OUTPATIENT)
Dept: CARDIOLOGY CLINIC | Age: 67
End: 2022-03-24
Payer: MEDICARE

## 2022-03-24 VITALS
WEIGHT: 237 LBS | DIASTOLIC BLOOD PRESSURE: 84 MMHG | SYSTOLIC BLOOD PRESSURE: 133 MMHG | BODY MASS INDEX: 33.18 KG/M2 | HEIGHT: 71 IN | HEART RATE: 54 BPM

## 2022-03-24 DIAGNOSIS — E78.00 PURE HYPERCHOLESTEROLEMIA: ICD-10-CM

## 2022-03-24 DIAGNOSIS — I25.10 CORONARY ARTERY DISEASE INVOLVING NATIVE CORONARY ARTERY OF NATIVE HEART WITHOUT ANGINA PECTORIS: Primary | ICD-10-CM

## 2022-03-24 DIAGNOSIS — I10 PRIMARY HYPERTENSION: ICD-10-CM

## 2022-03-24 DIAGNOSIS — Z95.820 S/P ANGIOPLASTY WITH STENT: ICD-10-CM

## 2022-03-24 PROBLEM — R07.9 CHEST PAIN: Status: RESOLVED | Noted: 2022-02-04 | Resolved: 2022-03-24

## 2022-03-24 PROCEDURE — 99215 OFFICE O/P EST HI 40 MIN: CPT | Performed by: INTERNAL MEDICINE

## 2022-03-24 RX ORDER — ROSUVASTATIN CALCIUM 40 MG/1
40 TABLET, COATED ORAL NIGHTLY
Qty: 90 TABLET | Refills: 3 | Status: SHIPPED | OUTPATIENT
Start: 2022-03-24 | End: 2022-08-25 | Stop reason: SDUPTHER

## 2022-03-24 RX ORDER — LISINOPRIL 10 MG/1
10 TABLET ORAL DAILY
Qty: 90 TABLET | Refills: 3 | Status: SHIPPED | OUTPATIENT
Start: 2022-03-24 | End: 2022-08-25 | Stop reason: SDUPTHER

## 2022-03-24 NOTE — PROGRESS NOTES
Chief Complaint   Patient presents with    New Patient    Follow-Up from Hospital       Follow  hospital follow up after treated for NSTEMI and had cath and pci feb 2022    EKG done 2-4-2022. Denied chest pain, sob, palpitations or dizziness    Run out of his meds for the last 2 days - last dose of brilinta 2 days back and missed yesterday dose    No leg edema    Feel stronger after pci    Past Surgical History:   Procedure Laterality Date    JOINT REPLACEMENT      Rt hip replaced about 3 weeks ago       No Known Allergies     Family History   Problem Relation Age of Onset    Cancer Mother     Cancer Father         Social History     Socioeconomic History    Marital status:      Spouse name: Not on file    Number of children: Not on file    Years of education: Not on file    Highest education level: Not on file   Occupational History    Not on file   Tobacco Use    Smoking status: Never Smoker    Smokeless tobacco: Current User     Types: Chew   Substance and Sexual Activity    Alcohol use: No    Drug use: Not Currently    Sexual activity: Not on file   Other Topics Concern    Not on file   Social History Narrative    Not on file     Social Determinants of Health     Financial Resource Strain:     Difficulty of Paying Living Expenses: Not on file   Food Insecurity:     Worried About Running Out of Food in the Last Year: Not on file    Noble of Food in the Last Year: Not on file   Transportation Needs:     Lack of Transportation (Medical): Not on file    Lack of Transportation (Non-Medical):  Not on file   Physical Activity:     Days of Exercise per Week: Not on file    Minutes of Exercise per Session: Not on file   Stress:     Feeling of Stress : Not on file   Social Connections:     Frequency of Communication with Friends and Family: Not on file    Frequency of Social Gatherings with Friends and Family: Not on file    Attends Latter day Services: Not on file    Active Member of Clubs or Organizations: Not on file    Attends Club or Organization Meetings: Not on file    Marital Status: Not on file   Intimate Partner Violence:     Fear of Current or Ex-Partner: Not on file    Emotionally Abused: Not on file    Physically Abused: Not on file    Sexually Abused: Not on file   Housing Stability:     Unable to Pay for Housing in the Last Year: Not on file    Number of [place] in the Last Year: Not on file    Unstable Housing in the Last Year: Not on file       Current Outpatient Medications   Medication Sig Dispense Refill    ticagrelor (BRILINTA) 90 MG TABS tablet Take 1 tablet by mouth 2 times daily 180 tablet 3    rosuvastatin (CRESTOR) 40 MG tablet Take 1 tablet by mouth nightly 90 tablet 3    metoprolol tartrate (LOPRESSOR) 25 MG tablet Take 1 tablet by mouth 2 times daily 180 tablet 3    lisinopril (PRINIVIL;ZESTRIL) 10 MG tablet Take 1 tablet by mouth daily 90 tablet 3    aspirin 81 MG EC tablet Take 1 tablet by mouth daily 30 tablet 3    metFORMIN (GLUCOPHAGE-XR) 500 MG extended release tablet Take 1 tablet by mouth once daily with breakfast 30 tablet 11    PARoxetine (PAXIL) 20 MG tablet Take 1 tablet by mouth every morning 90 tablet 3     No current facility-administered medications for this visit. Review of Systems -     General ROS: negative  Psychological ROS: negative  Hematological and Lymphatic ROS: No history of blood clots or bleeding disorder. Respiratory ROS: no cough,  or wheezing, the rest see HPI  Cardiovascular ROS: See HPI  Gastrointestinal ROS: negative  Genito-Urinary ROS: no dysuria, trouble voiding, or hematuria  Musculoskeletal ROS: negative  Neurological ROS: no TIA or stroke symptoms  Dermatological ROS: negative      Blood pressure 133/84, pulse 54, height 5' 11\" (1.803 m), weight 237 lb (107.5 kg).         Physical Examination:    General appearance - alert, well appearing, and in no distress  HEENT- Pink conjunctiva  , Non-icteri sclera,PERRLA  Mental status - alert, oriented to person, place, and time  Neck - supple, no significant adenopathy, no JVD, or carotid bruits  Chest - clear to auscultation, no wheezes, rales or rhonchi, symmetric air entry  Heart - normal rate, regular rhythm, normal S1, S2, no murmurs, rubs, clicks or gallops  Abdomen - soft, nontender, nondistended, no masses or organomegaly  MIKEY- no CVA or flank tenderness, no suprapubic tenderness  Neurological - alert, oriented, normal speech, no focal findings or movement disorder noted  Musculoskeletal/limbs - no joint tenderness, deformity or swelling   - peripheral pulses normal, no pedal edema, no clubbing or cyanosis  Skin - normal coloration and turgor, no rashes, no suspicious skin lesions noted  Psych- appropriate mood and affect    Lab  No results for input(s): CKTOTAL, CKMB, CKMBINDEX, TROPONINI in the last 72 hours.   CBC:   Lab Results   Component Value Date    WBC 6.3 02/05/2022    RBC 4.54 02/05/2022    RBC 5.13 12/29/2021    HGB 13.7 02/05/2022    HCT 41.6 02/05/2022    MCV 91.6 02/05/2022    MCH 30.2 02/05/2022    MCHC 32.9 02/05/2022    RDW 13.2 12/29/2021     02/05/2022    MPV 10.2 02/05/2022     BMP:    Lab Results   Component Value Date     02/05/2022    K 4.6 02/05/2022     02/05/2022    CO2 22 02/05/2022    BUN 16 02/05/2022    LABALBU 3.9 02/05/2022    CREATININE 0.8 02/05/2022    CALCIUM 8.9 02/05/2022    LABGLOM >90 02/05/2022    GLUCOSE 133 02/05/2022    GLUCOSE 150 12/29/2021     Hepatic Function Panel:    Lab Results   Component Value Date    ALKPHOS 132 02/05/2022    ALT 25 02/05/2022    AST 50 02/05/2022    PROT 6.5 02/05/2022    BILITOT 0.6 02/05/2022    BILIDIR 0.1 08/02/2021    LABALBU 3.9 02/05/2022     Magnesium:  No results found for: MG  Warfarin PT/INR:  No components found for: PTPATWAR, PTINRWAR  HgBA1c:    Lab Results   Component Value Date    LABA1C 6.8 12/29/2021     FLP:    Lab Results   Component Value Date TRIG 172 12/29/2021    HDL 42 12/29/2021    LDLCALC 84 12/29/2021    LABVLDL 34 12/29/2021     TSH:  No results found for: TSH       FINDINGS:  HEMODYNAMICS:  Left ventricular end-diastolic pressure was 6 mmHg. No  significant pressure gradient across the aortic valve upon pullback.     Ejection fraction 50% to 55%.     CORONARY ANGIOGRAM:  1. RIGHT CORONARY ARTERY:  Right coronary artery has significant  tortuosity in the proximal and mid segment. Proximal RCA has luminal  irregularities. Mid RCA has 10% to 20% mildly calcified lesions. Distal RCA with luminal irregularities. Codominant vessel. RPDA has  mild diffuse disease. 2.  LEFT MAIN CORONARY ARTERY:  Left main coronary artery is patent  without significant stenotic lesions. It bifurcates into left  circumflex and left anterior descending arteries. 3.  LEFT CIRCUMFLEX ARTERY:  Large, dominant vessel. Proximal LCX is  patent. Distal left circumflex artery into the LPL has 50% stenosis. 4.  LEFT ANTERIOR DESCENDING ARTERY:  Proximal LAD has a 95% severe  stenosis with findings consistent with probable ulcerated lesion. Mid  LAD has a 30% to 40% stenosis, nonobstructive. Distal LAD has mild  luminal irregularities.     MEDICATIONS:  See MAR.     COMPLICATIONS:  None.     ESTIMATED BLOOD LOSS:  Less than 50 mL.     ACCESS:  Right radial artery access. Vasc Band was applied. Hemostasis  was achieved.    Resolute  [place] 3.5 x 22 mm drug-eluting stent was successfully deployed. This was  postdilated using a 3.5 mm x 8 mm noncompliant balloon. IMPRESSION:  1.  Non-ST-elevation myocardial infarction. 2.  Severe 95% stenosis of the proximal segment of left anterior  descending artery, status post successful PCI and stenting.     RECOMMENDATIONS:  Continue to monitor on telemetry. Dual antiplatelet  therapy, high intensity statin therapy. Aggressive risk factor  modification. Obtain an echocardiogram.  IV fluids. Repeat labs in the  morning. Outpatient followup with Cardiology.  Ramírez Nieves MD     D: 02/04/2022 12:28:16           Conclusions    Summary   Normal left ventricle size and systolic function. Ejection fraction was   estimated at 55 %. There were no regional left ventricular wall motion   abnormalities and wall thickness was within normal limits. The left atrium is Mildly dilated. Signature    ----------------------------------------------------------------   Electronically signed by Clem Camp MD (Interpreting   physician) on 02/04/2022 at 06:44 PM   ----------------------------------------------------------------      Assessment     Diagnosis Orders   1. Coronary artery disease involving native coronary artery of native heart without angina pectoris  Hepatic Function Panel    Lipid Panel   2. Primary hypertension  Hepatic Function Panel    Lipid Panel   3. Pure hypercholesterolemia  Hepatic Function Panel    Lipid Panel   4. S/P angioplasty with stent feb 4, 2022  Hepatic Function Panel    Lipid Panel         Recent admission DX feb 2022  Chest pain/sob- s/p [facility]   NSTEMI s/p [facility]  CAD s/p PCI to LAD  HTN-on lower side   HLD-total 160, trigs 172, LDL 84   DM2- per attending.      EF 55%. Plan     Continue the current treatment and with constant vigilance to changes in symptoms and also any potential side effects. Return for care or seek medical attention immediately if symptoms got worse and/or develop new symptoms. Coronary artery disease, seems to be stable. Denies angina or change in breathing pattern  Advised to be compliant with DAPT  Given Brilinta 180 mg po x1 in the office    Hypertension, on medical treatment. Seems to be under good control. Patient is compliant with medical treatment. Hyperlipidemia: on statins, followed periodically. Patient need periodic lipid and liver profile.     Noncompliance with meds advised    Lipid panel and liver function test before next appointment    Hospital record reviewed    patient is advised to exercise 30 min s a day three times a week and about weight loss ,balance diet and     More fruits and vegetables . target  to 130  Activity as tolerated  Want to rehab at his own home    Hx of CHIKIS not using CPAP and to see pulm soon    Discussed use, benefit, and side effects of prescribed medications. All patient questions answered. Pt voiced understanding. Instructed to continue current medications, diet and exercise. Continue risk factor modification and medical management. Patient agreed with treatment plan. Follow up as directed.     I spent 40 minutes involved in face-to-face discussion of medical issues, prognosis, record review  and plan with the patient today and more than 50% of the time was spent on counseling and coordination of care      RTC in 5 months      Maty Hess [facility]

## 2022-04-08 NOTE — TELEPHONE ENCOUNTER
Yoly James called requesting a refill on the following medications:  Requested Prescriptions     Pending Prescriptions Disp Refills    metoprolol tartrate (LOPRESSOR) 25 MG tablet 180 tablet 3     Sig: Take 1 tablet by mouth 2 times daily     Pharmacy verified:[facility]  . pv      Date of last visit:   Date of next visit (if applicable): 5/18/4682

## 2022-04-12 NOTE — PROGRESS NOTES
[facility] initial consultation note    Charlene Andrews                                                Chief complaint: Charlene Andrews is a 75 y. o.oldmale came for further evaluation regarding his sleep apnea  with referral from Ms. Agnes Elder, CNP. [place]:    Sleep/Wake schedule:  Usual time to go to bed during the work/regular day of week: 11 PM  Usual time to wake up during the work//regular day of week : 8 AM  Over the weekends his sleep schedule: [x] Remain same. He is a semiretired. He usually falls a sleep in less than: 30 minutes  He takes naps: Yes. Number of naps per week: He takes nap every day  During each nap he spends a total of: Up to 2 hours. He is currently not using any BiPAP device during napping. The naps were reported as refreshing: Yes. compared to his sleep at nighttime. Sleep Hygiene:    Is the temperature and evironment in his bed room is acceptable to him: Yes. He watches Television in his bed room: Yes. He shuts his TV off 30 minutes before going to sleep in the bedroom  He read books, study, pay bills etc in the bed: Yes. Frequency He wake up during night/sleep: 2 times a week  Majority of nocturnal awakenings are for urination: No.  Very rarely  Difficulty in falling back to sleep after nocturnal awakenings: No.    Do you drink coffee: Yes. He drinks 3 cups of coffee in the morning I.e  before noon. Do you drink caffeinated beverages i.e sodas: Yes. He drinks 2 cans of caffeinated sodas per week  Do you drink tea: No.      Do you drink alcoholic beverages: Yes. He drinks alcohol very rarely.   History of recreational drug use: No.     Social History     Tobacco Use    Smoking status: Never Smoker    Smokeless tobacco: Current User     Types: Chew    Tobacco comment: pt has been using chew for about 40 years   Substance Use Topics    Alcohol use: No    Drug use: Not Currently       Sleep apnea symptoms:    Hewas diagnosed with sleep apnea in the past.  Please see diagnostic data section for details. History of headaches in the morning:No.  History of dry mouth in the morning: Yes. History of palpitations during night time/nocturnal awakenings: No.  History of sweating during night time/nocturnal awakenings: Yes    General:  History of head injury in the past: No.   History of seizures: NO.  Rest less legs syndrome symptoms:NO  History suggestive of periodic limb movements during sleep: He was diagnosed with a periodic limb movement disorder in the past.  He is supposed to be using Klonopin 1 mg p.o. nightly. He quit using Klonopin 3 years back. History suggestive of hypnagogic hallucinations: NO  History suggestive of hypnopompic hallucinations: NO  History suggestive of sleep talking: NO  History suggestive of sleep walking:NO  History suggestive of bruxism: NO     History suggestive of cataplexy: NO  History suggestive of sleep paralysis:NO    Family history of sleep disorders:  Family history of obstructive sleep apnea: Yes. All his 4 brothers were diagnosed with obstructive sleep apnea. They are using CPAP devices. Family history of Narcolepsy: No.   Family history of Rest less legs syndrome : NO      History regarding old sleep studies:  Prior history of sleep study: Yes. Please see the diagnostic data section for details. Using CPAP device: No.  Currently using home Oxygen: NO.      Patient considerations:  Is the patient is ambulatory: Yes  Patient is currently using: None of these Wheelchair, [employer] or [employer].   Para/Quadriplegic: NO  Hearing deficit : NO  Claustrophobic: NO  MDD : NO  Blind: NO  Incontinent: NO  Para/Quadraplegi: NO.   Need transportation to and from Sleep Center:NO    Social History:  Social History     Tobacco Use    Smoking status: Never Smoker    Smokeless tobacco: Current User     Types: Chew    Tobacco comment: pt has been using chew for about 40 years Substance Use Topics    Alcohol use: No    Drug use: Not Currently   . He is currently working: Yes. He is currently working on part-time basis. He is doing farming. He usually goes to his work at: 8:30 AM to 9 AM  He completes his work at: 4:30 PM.                       Past Medical History:   Diagnosis Date    Depression     Diabetes mellitus ([facility] [address]     dx about 3 months ago    Hyperlipidemia     Hypertension     Restless leg     Sleep apnea        Past Surgical History:   Procedure Laterality Date    JOINT REPLACEMENT      Rt hip replaced about 3 weeks ago       No Known Allergies    Current Outpatient Medications   Medication Sig Dispense Refill    metoprolol tartrate (LOPRESSOR) 25 MG tablet Take 1 tablet by mouth 2 times daily 180 tablet 3    rosuvastatin (CRESTOR) 40 MG tablet Take 1 tablet by mouth nightly 90 tablet 3    lisinopril (PRINIVIL;ZESTRIL) 10 MG tablet Take 1 tablet by mouth daily 90 tablet 3    ticagrelor (BRILINTA) 90 MG TABS tablet Take 1 tablet by mouth 2 times daily 8 tablet 0    aspirin 81 MG EC tablet Take 1 tablet by mouth daily (Patient taking differently: Take 81 mg by mouth daily 2 -325 mg daily) 30 tablet 3    metFORMIN (GLUCOPHAGE-XR) 500 MG extended release tablet Take 1 tablet by mouth once daily with breakfast 30 tablet 11    PARoxetine (PAXIL) 20 MG tablet Take 1 tablet by mouth every morning 90 tablet 3     No current facility-administered medications for this visit. Family History   Problem Relation Age of Onset    Cancer Mother     Cancer Father         Review of Systems:    General/Constitutional: He gained 15 pounds of weight from his last sleep study performed on 24 June 2008. No fever or chills. HENT: Negative. Eyes: Negative. Upper respiratory tract: No nasal stuffiness or post nasal drip. Lower respiratory tract/ lungs: No cough with no sputum production. No hemoptysis. Cardiovascular: No palpitations or chest pain.   Gastrointestinal: No nausea or vomiting. Neurological: No focal neurologiacal weakness. Extremities: No edema. Musculoskeletal: No complaints. Genitourinary: No complaints. Hematological: Negative. Psychiatric/Behavioral: Negative. Skin: No itching. /78 (Site: Left Upper Arm, Position: Sitting, Cuff Size: Large Adult)   Pulse 57   Temp 98.6 °F (37 °C)   Ht 5' 11.5\" (1.816 m)   Wt 235 lb (106.6 kg)   SpO2 96% Comment: room air at rest  BMI 32.32 kg/m²   BMI:  Body mass index is 32.32 kg/m². Mallampati airway Class:4  Neck Circumference:.17.5 Inches  [place] sleepiness score 5/11/22: 8  ( On further questioning he gives a history of hypersomnia ( Excessive daytime sleepiness). No reported motor vehicle accidents due to his sleepiness). Sleep apnea quality of life questionnaire:68    Physical Exam:  Nursing note and vitals reviewed. Constitutional: Patient appears moderately built and moderately nourished. No distress. Patient is oriented to person, place, and time. HENT:   Head: Normocephalic and atraumatic. Right Ear: External ear normal.   Left Ear: External ear normal.   Mouth/Throat: Oropharynx is clear and moist.  No oral thrush. Eyes: Conjunctivae are normal. Pupils are equal, round, and reactive to light. No scleral icterus. Neck: Neck supple. No JVD present. No tracheal deviation present. Cardiovascular: Normal rate, regular rhythm, normal heart sounds. No murmur heard. Pulmonary/Chest: Effort normal and breath sounds normal. No stridor. No respiratory distress. No wheezes. No rales. Patient exhibits no tenderness. Abdominal: Soft. Patient exhibits no distension. No tenderness. Musculoskeletal: Normal range of motion. Extremities: Patient exhibits no edema and no tenderness. Lymphadenopathy:  No cervical adenopathy. Neurological: Patient is alert and oriented to person, place, and time. Skin: Skin is warm and dry. Patient is not diaphoretic.    Psychiatric: Patient  has a normal mood and affect. Patient behavior is normal.     Diagnostic Data:    Sleep test: 6/24/2008        CPAP test: 07/09/2008          Assessment:  -He was diagnosed with moderately Severe obstructive sleep apnea by baseline sleep study performed on 24 June 2008. He subsequently underwent PAP titration study on 9 July 2008 at [facility] sleep disorder center. He used to be on treatment with on treatment with BiPAP pressures of 14/10cm H20. He is a BiPAP device due to working 3 years back. He is currently not on any treatment for his obstructive sleep apnea. Patient currently suffering with hypersomnia. He want to go back on the BiPAP therapy if he still found to have sleep apnea. Need to check the current status of sleep apnea to use to new CPAP/BiPAP device.  -Inadequate sleep hygiene.  -Hypersomnia ( Excessive daytime sleepiness) may be due to obstructive sleep apnea Vs Inadequate sleep hygiene. -Depression on treatment with medications. He is currently taking Paxil.  -Coronary artery disease status post stent placement. He follows with Dr. Christian Mclain MD  -Essential hypertension on treatment with medications under control.  -Type 2 diabetes mellitus.  -History of periodic limb movement disorder. He used to be on treatment with Klonopin 1 mg p.o. nightly. He quit taking Klonopin. He is currently asymptomatic. Recommendations/Plan:  - Schedule patient for nocturnal polysomnogram (Sleep study) with split night protocol at [facility] sleep lab to diagnose sleep apnea and to get optimal pressure I.e CPAP or BiPAP to start/continue PAP therapy.  Patient to follow with my clinic at [facility] sleep clinic in 6 to 8 weeks with CPAP download for further evaluation.  -I had a discussion with patient regarding avialable treatment options for his sleep disorder breathing including but not limited to CPAP titration in the sleep lab Vs.Dental appliance placement with referral to a local dentist Vs other available surgical options including Uvulopalatopharyngoplasty, maxillomandibular ostomy and tracheostomy as last option. At the end of discussion, he decided on CPAP/BiPAP/AutoSV as a treatment if he found to have obstructive sleep apnea during above test/study.  -He will be issued a new PAP device (CPAP/BiPAP) after above requested sleep study.  -He advised to keep good compliance with recommended pressure to get optimal results and clinical improvement.  -He was advised to call [employer] company regarding supplies if needed. -He was advised to practice good sleep hygiene techniques. He was provided with a hand out.  -He was instructed to not to drive any motor vehicles or operate heavy equipment if he feels sleepy. -He was advised to loose weight by controlling diet and doing exercise once cleared by his cardiologist Dr. Robbie Milner MD.  -He was advised call my office for earlier appointment if needed for worsening of sleep symptoms.  -Cheyenne Stephens educated about my impression and plan. Patient verbalizes understanding.      -I personally reviewed updated the Past medical hx, Past surgical hx,Social hx, Family hx, Medications, Allergies in the discrete data section of the patient chart along with labs, Pulmonary medicine,Sleep medicine related, Pathological, Microbiological and Radiological investigations.

## 2022-05-11 ENCOUNTER — OFFICE VISIT (OUTPATIENT)
Dept: PULMONOLOGY | Age: 67
End: 2022-05-11
Payer: MEDICARE

## 2022-05-11 VITALS
DIASTOLIC BLOOD PRESSURE: 78 MMHG | BODY MASS INDEX: 31.83 KG/M2 | HEIGHT: 72 IN | HEART RATE: 57 BPM | WEIGHT: 235 LBS | OXYGEN SATURATION: 96 % | TEMPERATURE: 98.6 F | SYSTOLIC BLOOD PRESSURE: 122 MMHG

## 2022-05-11 DIAGNOSIS — G47.10 HYPERSOMNIA: ICD-10-CM

## 2022-05-11 DIAGNOSIS — I25.10 CORONARY ARTERY DISEASE INVOLVING NATIVE CORONARY ARTERY OF NATIVE HEART WITHOUT ANGINA PECTORIS: ICD-10-CM

## 2022-05-11 DIAGNOSIS — G47.30 SLEEP APNEA, UNSPECIFIED TYPE: Primary | ICD-10-CM

## 2022-05-11 DIAGNOSIS — F32.A DEPRESSION, UNSPECIFIED DEPRESSION TYPE: ICD-10-CM

## 2022-05-11 DIAGNOSIS — I10 ESSENTIAL HYPERTENSION: ICD-10-CM

## 2022-05-11 PROCEDURE — 99204 OFFICE O/P NEW MOD 45 MIN: CPT | Performed by: INTERNAL MEDICINE

## 2022-05-11 NOTE — PROGRESS NOTES
Chief Complaint: Moris Chang is here for sleep consult prior studies 2008  Mallampati airway Class:4  Neck Circumference:.17.5 Inches    [place] sleepiness score 5/11/22:   Sleep apnea quality of life questionnaire:.

## 2022-06-07 ENCOUNTER — HOSPITAL ENCOUNTER (OUTPATIENT)
Dept: SLEEP CENTER | Age: 67
Discharge: HOME | End: 2022-06-09
Payer: MEDICARE

## 2022-06-07 DIAGNOSIS — I10 ESSENTIAL HYPERTENSION: ICD-10-CM

## 2022-06-07 DIAGNOSIS — I25.10 CORONARY ARTERY DISEASE INVOLVING NATIVE CORONARY ARTERY OF NATIVE HEART WITHOUT ANGINA PECTORIS: ICD-10-CM

## 2022-06-07 DIAGNOSIS — F32.A DEPRESSION, UNSPECIFIED DEPRESSION TYPE: ICD-10-CM

## 2022-06-07 DIAGNOSIS — G47.10 HYPERSOMNIA: ICD-10-CM

## 2022-06-07 DIAGNOSIS — G47.30 SLEEP APNEA, UNSPECIFIED TYPE: ICD-10-CM

## 2022-06-07 PROCEDURE — 95810 POLYSOM 6/> YRS 4/> PARAM: CPT

## 2022-06-09 NOTE — PROGRESS NOTES
[address] [place] [address]                               SLEEP STUDY REPORT    PATIENT NAME: Esperanza Pope                    :        1955  MED REC NO:   [id number]                           ROOM:  ACCOUNT NO:   [de-identified]                           ADMIT DATE: 2022  PROVIDER:     Cyrilla Blizzard. MD [place]    DATE OF STUDY:  2022    REFERRING PROVIDER:  Shyla Young CNP    The patient's height is 5 feet 11.5 inches, weight is 235 pounds with a  BMI of 32.3. HISTORY:  The patient is a 25-year-old gentleman who was initially  evaluated by me on 2022. The patient currently suffering with  hypersomnia. The patient had a history of moderately severe obstructive  sleep apnea. The patient used to be on treatment with a BiPAP pressures  of 11/10 cm of water. The patient currently not on any treatment. He  want to go back on BiPAP therapy if he is still found to have sleep  apnea. He is currently in need of new BiPAP device/PAP device. He  gained  pounds of weight from his last sleep study performed in . The patient is scheduled for split-night sleep study; however, the  patient did not qualify for split-night sleep study hence, the patient  underwent baseline sleep study due to insufficient respiratory events in  first half of the study.     METHODS:  The patient underwent digital polysomnography in compliance  with the standards and specifications from the AASM Manual including the  simultaneous recording of 3 EEG channels (F4-M1, C4-M1, and O2-M1 with  back up electrodes F3-M2, C3-M2, and O1-M2), 2 EOG channels (E1-M2, and  E2-M1,), EMG (chin, left & right leg), EKG, Nonin pulse oximetry with   less than 2 second averaging time, body position, airflow recorded by  oral-nasal thermal sensor and nasal air pressure transducer, plus  respiratory effort recorded by calibrated respiratory inductance  plethysmography (RIP), flow volume loop, sound and video. Sleep staging  and scoring followed the standard put forth by the American Academy of  Sleep Medicine and utilized the 1B obstructive hypopnea event  desaturation of 4 percent or greater. INTERPRETATION:  This is a baseline sleep study and the study was  performed on 06/07/2022. The study was started at 09:30 p.m. and was  terminated at 05:05 a.m. with a total recording time of 455.3 minutes,  and sleep period time was 418.8 minutes and total sleep time was 352.8  minutes. Overall sleep efficiency was 77.5% of total sleep time. The  sleep onset latency was 36.4 minutes, and wake after sleep onset was 66  minutes, and REM sleep latency was 292 minutes. SLEEP STAGING AND DISTRIBUTION SUMMARY:  Revealed the patient spent  112.5 minutes in stage I consisting of 31.9%, 192.8 minutes in stage II  consisting of 54.7%, 47.5 minutes in REM sleep consisting of 13.5% of  total sleep time. The patient had absent slow-wave sleep. RESPIRATORY EVENT ANALYSIS:  Revealed the patient had a total of 79  apneas. Out of 79, 22 were central, 51 were obstructive and 6 were  mixed in nature. The patient also had a total of 100 hypopneas. Out of  100, 99 were obstructive in nature. The total number of apneas and  hypopneas recorded during the study was 179 with an apnea-hypopnea index  of 30.4. The patient's REM sleep apnea-hypopnea index was 34.1. POSITION ANALYSIS:  Revealed the patient spent 125.7 minutes in supine  position, 10.5 minutes in prone position, 97.3 minutes in left lateral  position, 119.3 minutes in right lateral position with a supine  apnea-hypopnea index was 57.8 whereas prone position apnea-hypopnea  index was 28.7 and left lateral position apnea-hypopnea index was 32.1,  right lateral position apnea-hypopnea index was 23.1. PERIODIC LIMB MOVEMENT ANALYSIS:  Revealed the patient had a total of 5  periodic limb movements.   Out of 5, 2 of them were associated with  arousals with a PLM index of 0.9. PLM arousal index is 0.3. The  patient had a total of 99 spontaneous arousals with a spontaneous  arousal index of 16.8. OXYGEN SATURATION MONITORING:  Revealed the patient had a maximum oxygen  desaturation to 84% with a mean oxygen saturation of 95.2%. The patient  spent a total of 5.2 minutes below oxygen saturation less than 88%. EKG MONITORING:  Revealed normal sinus rhythm. The patient found to have moderate snoring during the sleep study. IMPRESSION:  1. Severe obstructive sleep apnea with worsening of respiratory events  in REM sleep and also in supine position. 2.  Decreased and delayed REM sleep limiting the interpretation of sleep  study. 3.  Periodic limb movements with no significant arousals. 4.  Nocturnal hypoxia. The patient spent a total of 5.2 minutes below  oxygen saturation less than 88%. 5.  Hypersomnia, most likely due to sleep apnea. 6.  Depression, on treatment with medications. 7.  Coronary artery disease, status post stent placement. 8.  Essential hypertension. 9.  Type 2 diabetes mellitus. RECOMMENDATIONS:  1. The patient was initially scheduled for split-night sleep study to  diagnose and treat sleep apnea. 2.  The patient will be scheduled for in-lab CPAP titration as soon as  possible. During the re-titration the patient should be consider for  advance him to BiPAP titration if needed for protocol. 3.  The patient should be scheduled for followup with my clinic in six  to eight weeks. I recommended CPAP/BiPAP therapy for clinical  reevaluation with review of download. 4.  The patient will be issued a new CPAP/BiPAP device after  re-titration study. Thanks to Monika Sears CNP, for giving me this opportunity to  participate in the care of this pleasant gentleman.         Jennifer Fonseca MD    D: 06/08/2022 17:35:06       T: 06/09/2022 13:30:29 [email or web address]  Job#: [id number]     Doc#: [id number]    CC:

## 2022-06-22 DIAGNOSIS — G47.10 HYPERSOMNIA: ICD-10-CM

## 2022-06-22 DIAGNOSIS — I25.10 CORONARY ARTERY DISEASE INVOLVING NATIVE CORONARY ARTERY OF NATIVE HEART WITHOUT ANGINA PECTORIS: ICD-10-CM

## 2022-06-22 DIAGNOSIS — Z99.89 OSA ON CPAP: ICD-10-CM

## 2022-06-22 DIAGNOSIS — G47.30 SLEEP APNEA, UNSPECIFIED TYPE: Primary | ICD-10-CM

## 2022-06-22 DIAGNOSIS — G47.33 OSA ON CPAP: ICD-10-CM

## 2022-06-22 LAB — STATUS: NORMAL

## 2022-06-30 ENCOUNTER — TELEPHONE (OUTPATIENT)
Dept: SLEEP CENTER | Age: 67
End: 2022-06-30

## 2022-06-30 NOTE — TELEPHONE ENCOUNTER
Please r/s Casper's f/u appt- his CPAP titration is scheduled for 08/07/22.         Thank you,  Mik Thompson

## 2022-08-07 ENCOUNTER — HOSPITAL ENCOUNTER (OUTPATIENT)
Dept: SLEEP CENTER | Age: 67
Discharge: HOME | End: 2022-08-09
Payer: MEDICARE

## 2022-08-07 DIAGNOSIS — I25.10 CORONARY ARTERY DISEASE INVOLVING NATIVE CORONARY ARTERY OF NATIVE HEART WITHOUT ANGINA PECTORIS: ICD-10-CM

## 2022-08-07 DIAGNOSIS — G47.30 SLEEP APNEA, UNSPECIFIED TYPE: ICD-10-CM

## 2022-08-07 DIAGNOSIS — G47.10 HYPERSOMNIA: ICD-10-CM

## 2022-08-07 DIAGNOSIS — G47.33 OSA ON CPAP: ICD-10-CM

## 2022-08-07 DIAGNOSIS — Z99.89 OSA ON CPAP: ICD-10-CM

## 2022-08-07 PROCEDURE — 95811 POLYSOM 6/>YRS CPAP 4/> PARM: CPT

## 2022-08-08 LAB — STATUS: NORMAL

## 2022-08-14 DIAGNOSIS — I10 ESSENTIAL HYPERTENSION: ICD-10-CM

## 2022-08-14 DIAGNOSIS — G47.33 OSA ON CPAP: Primary | ICD-10-CM

## 2022-08-14 DIAGNOSIS — F32.A DEPRESSION, UNSPECIFIED DEPRESSION TYPE: ICD-10-CM

## 2022-08-14 DIAGNOSIS — Z99.89 OSA ON CPAP: Primary | ICD-10-CM

## 2022-08-14 DIAGNOSIS — G47.10 HYPERSOMNIA: ICD-10-CM

## 2022-08-14 DIAGNOSIS — I25.10 CORONARY ARTERY DISEASE INVOLVING NATIVE CORONARY ARTERY OF NATIVE HEART WITHOUT ANGINA PECTORIS: ICD-10-CM

## 2022-08-16 NOTE — PROGRESS NOTES
[address] [place] [address]                               SLEEP STUDY REPORT    PATIENT NAME: Shira Hardwick                    :        1955  MED REC NO:   [id number]                           ROOM:  ACCOUNT NO:   [de-identified]                           ADMIT DATE: 2022  PROVIDER:     Brigette Veronica MD    DATE OF STUDY:  2022    CPAP TITRATION STUDY REPORT    REFERRING PROVIDER:  Amada Hernandez CNP    HISTORY:  The patient is a 68-year-old gentleman underwent initial  baseline sleep study on 2022. The patient was diagnosed with  severe obstructive sleep apnea with worsening of respiratory events in  REM sleep. The patient had associated comorbidities including  hypersomnia, depression and coronary artery disease. The patient is  scheduled for CPAP titration as a treatment. METHODS:  The patient underwent digital polysomnography in compliance  with the standards and specifications from the AASM Manual including the  simultaneous recording of 3 EEG channels (F4-M1, C4-M1, and O2-M1 with  back up electrodes F3-M2, C3-M2, and O1-M2), 2 EOG channels (E1-M2, and  E2-M1,), EMG (chin, left & right leg), EKG, Nonin pulse oximetry with   less than 2 second averaging time, body position, airflow recorded by  oral-nasal thermal sensor and nasal air pressure transducer, plus  respiratory effort recorded by calibrated respiratory inductance  plethysmography (RIP), flow volume loop, sound and video. Sleep staging  and scoring followed the standard put forth by the American Academy of  Sleep Medicine and utilized the 1B obstructive hypopnea event  desaturation of 4 percent or greater. INTERPRETATION:  This is a CPAP titration study and the study was  performed on 2022.   The study was started at 10:38 p.m. and was  terminated at 05:14 a.m. with a total recording time of 395.9 minutes,  sleep period time was 357.2 minutes, total sleep time was 336 minutes,  and overall sleep efficiency was 84.9% of total sleep time. The sleep  onset latency was 38.7 minutes and wake after sleep onset was 21.2  minutes, REM sleep latency was 225.5 minutes. SLEEP STAGING AND DISTRIBUTION SUMMARY:  Revealed the patient spent 92  minutes in stage I consisting of 27.4% of total sleep time, 188.5  minutes in stage II consisting of 56.1%, 55.5 minutes in REM sleep  consisting of 16.5% of total sleep time. The patient had absent slow  wave sleep. CPAP TITRATION STUDY:  The CPAP titration study was started with a CPAP  pressure of 5 cm of water and the CPAP pressure was gradually increased  to a CPAP pressure of 5 cm of water by titrating to apneas and  hypopneas. At a CPAP pressure of 12 cm of water, the patient spent 2  hours 13 minutes in bed. Out of 2 hours 13 minutes, the patient slept  for a period of 55.3 minutes in REM sleep and 1 hour 10 minutes in  non-REM sleep. At a CPAP pressure of 12 cm of water, the patient had a  total of 5 obstructive apneas and 1 central apnea event and 3  obstructive hypopneas with an apnea-hypopnea index of 4.3. The maximum  oxygen desaturation recorded at this pressure was 91% with a mean oxygen  saturation of 95.5%. This titration was performed on room air. PERIODIC LIMB MOVEMENT ANALYSIS:  Revealed the patient had a total of 52  periodic limb movements. Out of 52, 2 of them were associated with  arousals with a PLM index of 9.3. PLM arousal index is 0.4. The  patient had a total of 53 spontaneous arousals with a spontaneous  arousal index of 9.5. EKG MONITORING:  Revealed normal sinus rhythm. IMPRESSION:  1. Severe obstructive sleep apnea with worsening of respiratory events  in REM sleep. The patient had optimal titration to a CPAP pressure of  12 cm of water with room air. 2.  Hypersomnia, most likely due to sleep apnea.   3.  Depression, on treatment with medications. 4.  Coronary artery disease. 5.  Essential hypertension. 6.  Periodic limb movements with no significant arousals. 7.  Decreased and delayed REM sleep limiting the interpretation of the  titration study. RECOMMENDATIONS:  1. For the patient's sleep-disordered breathing, we recommend starting  therapy with a CPAP pressure of 12 cm of water with room air. 2.  Specific recommendations include, the patient's choice of interface  was large size F20 mask. 3.  The patient should be scheduled for a followup with my clinic in six  to eight weeks on recommended CPAP therapy for a clinical reevaluation  with review of download. Thanks to Abhishek Snow CNP, for giving me this opportunity to  participate in the care of this pleasant gentleman.         Julissa Anna MD    D: 08/14/2022 23:42:46       T: 08/15/2022 18:08:55     [email or web address]  Job#: [id number]     Doc#: [id number]    CC:

## 2022-08-18 ENCOUNTER — TELEPHONE (OUTPATIENT)
Dept: SLEEP CENTER | Age: 67
End: 2022-08-18

## 2022-08-25 ENCOUNTER — OFFICE VISIT (OUTPATIENT)
Dept: CARDIOLOGY CLINIC | Age: 67
End: 2022-08-25
Payer: MEDICARE

## 2022-08-25 VITALS
BODY MASS INDEX: 33.43 KG/M2 | HEART RATE: 47 BPM | HEIGHT: 71 IN | DIASTOLIC BLOOD PRESSURE: 77 MMHG | SYSTOLIC BLOOD PRESSURE: 130 MMHG | WEIGHT: 238.8 LBS

## 2022-08-25 DIAGNOSIS — I25.10 CORONARY ARTERY DISEASE INVOLVING NATIVE CORONARY ARTERY OF NATIVE HEART WITHOUT ANGINA PECTORIS: Primary | ICD-10-CM

## 2022-08-25 DIAGNOSIS — Z95.820 S/P ANGIOPLASTY WITH STENT: ICD-10-CM

## 2022-08-25 DIAGNOSIS — R00.1 SINUS BRADYCARDIA: ICD-10-CM

## 2022-08-25 DIAGNOSIS — E78.00 PURE HYPERCHOLESTEROLEMIA: ICD-10-CM

## 2022-08-25 DIAGNOSIS — I10 PRIMARY HYPERTENSION: ICD-10-CM

## 2022-08-25 PROCEDURE — 99214 OFFICE O/P EST MOD 30 MIN: CPT | Performed by: INTERNAL MEDICINE

## 2022-08-25 PROCEDURE — 1123F ACP DISCUSS/DSCN MKR DOCD: CPT | Performed by: INTERNAL MEDICINE

## 2022-08-25 PROCEDURE — 93000 ELECTROCARDIOGRAM COMPLETE: CPT | Performed by: INTERNAL MEDICINE

## 2022-08-25 RX ORDER — LISINOPRIL 10 MG/1
10 TABLET ORAL DAILY
Qty: 90 TABLET | Refills: 3 | Status: SHIPPED | OUTPATIENT
Start: 2022-08-25

## 2022-08-25 RX ORDER — ROSUVASTATIN CALCIUM 40 MG/1
40 TABLET, COATED ORAL NIGHTLY
Qty: 90 TABLET | Refills: 3 | Status: SHIPPED | OUTPATIENT
Start: 2022-08-25

## 2022-08-25 NOTE — PROGRESS NOTES
Chief Complaint   Patient presents with    Follow-up    Coronary Artery Disease        Hx of   hospital  treatment forr NSTEMI and had cath and pci feb 2022        5 month follow up. EKG done today.     Denied chest pain, sob, palpitations     Once in a while dizziness on standing fast    Run out of his meds for the last 2 days - last dose of brilinta 2 days back and missed yesterday dose    No leg edema    Feel stronger after pci    Past Surgical History:   Procedure Laterality Date    JOINT REPLACEMENT      Rt hip replaced about 3 weeks ago       No Known Allergies     Family History   Problem Relation Age of Onset    Cancer Mother     Cancer Father         Social History     Socioeconomic History    Marital status:      Spouse name: Not on file    Number of children: Not on file    Years of education: Not on file    Highest education level: Not on file   Occupational History    Not on file   Tobacco Use    Smoking status: Never    Smokeless tobacco: Current     Types: Chew    Tobacco comments:     pt has been using chew for about 40 years   Substance and Sexual Activity    Alcohol use: No    Drug use: Not Currently    Sexual activity: Not on file   Other Topics Concern    Not on file   Social History Narrative    Not on file     Social Determinants of Health     Financial Resource Strain: Low Risk     Difficulty of Paying Living Expenses: Not hard at all   Food Insecurity: No Food Insecurity    Worried About Running Out of Food in the Last Year: Never true    Ran Out of Food in the Last Year: Never true   Transportation Needs: Not on file   Physical Activity: Not on file   Stress: Not on file   Social Connections: Not on file   Intimate Partner Violence: Not on file   Housing Stability: Not on file       Current Outpatient Medications   Medication Sig Dispense Refill    metFORMIN (GLUCOPHAGE-XR) 500 mg extended release tablet Take 1 tablet by mouth in the morning and at bedtime 180 tablet 2    metoprolol tartrate (LOPRESSOR) 25 MG tablet Take 1 tablet by mouth 2 times daily 180 tablet 3    rosuvastatin (CRESTOR) 40 MG tablet Take 1 tablet by mouth nightly 90 tablet 3    lisinopril (PRINIVIL;ZESTRIL) 10 MG tablet Take 1 tablet by mouth daily 90 tablet 3    ticagrelor (BRILINTA) 90 MG TABS tablet Take 1 tablet by mouth 2 times daily 8 tablet 0    aspirin 81 MG EC tablet Take 1 tablet by mouth daily (Patient taking differently: Take 81 mg by mouth daily 2 -325 mg daily) 30 tablet 3    PARoxetine (PAXIL) 20 MG tablet Take 1 tablet by mouth every morning 90 tablet 3     No current facility-administered medications for this visit. Review of Systems -     General ROS: negative  Psychological ROS: negative  Hematological and Lymphatic ROS: No history of blood clots or bleeding disorder. Respiratory ROS: no cough,  or wheezing, the rest see HPI  Cardiovascular ROS: See HPI  Gastrointestinal ROS: negative  Genito-Urinary ROS: no dysuria, trouble voiding, or hematuria  Musculoskeletal ROS: negative  Neurological ROS: no TIA or stroke symptoms  Dermatological ROS: negative      Blood pressure 130/77, pulse (!) 47, height 5' 11\" (1.803 m), weight 238 lb 12.8 oz (108.3 kg).         Physical Examination:    General appearance - alert, well appearing, and in no distress  HEENT- Pink conjunctiva  , Non-icteri sclera,PERRLA  Mental status - alert, oriented to person, place, and time  Neck - supple, no significant adenopathy, no JVD, or carotid bruits  Chest - clear to auscultation, no wheezes, rales or rhonchi, symmetric air entry  Heart - normal rate, regular rhythm, normal S1, S2, no murmurs, rubs, clicks or gallops  Abdomen - soft, nontender, nondistended, no masses or organomegaly  MIKEY- no CVA or flank tenderness, no suprapubic tenderness  Neurological - alert, oriented, normal speech, no focal findings or movement disorder noted  Musculoskeletal/limbs - no joint tenderness, deformity or swelling   - peripheral pulses normal, no pedal edema, no clubbing or cyanosis  Skin - normal coloration and turgor, no rashes, no suspicious skin lesions noted  Psych- appropriate mood and affect    Lab  No results for input(s): CKTOTAL, CKMB, CKMBINDEX, TROPONINI in the last 72 hours. CBC:   Lab Results   Component Value Date/Time    WBC 6.3 02/05/2022 06:20 AM    RBC 4.54 02/05/2022 06:20 AM    RBC 5.13 12/29/2021 10:14 AM    HGB 13.7 02/05/2022 06:20 AM    HCT 41.6 02/05/2022 06:20 AM    MCV 91.6 02/05/2022 06:20 AM    MCH 30.2 02/05/2022 06:20 AM    MCHC 32.9 02/05/2022 06:20 AM    RDW 13.2 12/29/2021 10:14 AM     02/05/2022 06:20 AM    MPV 10.2 02/05/2022 06:20 AM     BMP:    Lab Results   Component Value Date/Time     02/05/2022 06:20 AM    K 4.6 02/05/2022 06:20 AM     02/05/2022 06:20 AM    CO2 22 02/05/2022 06:20 AM    BUN 16 02/05/2022 06:20 AM    LABALBU 3.9 02/05/2022 06:20 AM    CREATININE 0.8 02/05/2022 06:20 AM    CALCIUM 8.9 02/05/2022 06:20 AM    LABGLOM >90 02/05/2022 06:20 AM    GLUCOSE 133 02/05/2022 06:20 AM    GLUCOSE 150 12/29/2021 10:14 AM     Hepatic Function Panel:    Lab Results   Component Value Date/Time    ALKPHOS 132 02/05/2022 06:20 AM    ALT 25 02/05/2022 06:20 AM    AST 50 02/05/2022 06:20 AM    PROT 6.5 02/05/2022 06:20 AM    BILITOT 0.6 02/05/2022 06:20 AM    BILIDIR 0.1 08/02/2021 08:56 AM    LABALBU 3.9 02/05/2022 06:20 AM     Magnesium:  No results found for: MG  Warfarin PT/INR:  No components found for: PTPATWAR, PTINRWAR  HgBA1c:    Lab Results   Component Value Date/Time    LABA1C 7.0 06/13/2022 10:11 AM     FLP:    Lab Results   Component Value Date/Time    TRIG 172 12/29/2021 10:14 AM    HDL 42 12/29/2021 10:14 AM    LDLCALC 84 12/29/2021 10:14 AM    LABVLDL 34 12/29/2021 10:14 AM     TSH:  No results found for: TSH       FINDINGS:  HEMODYNAMICS:  Left ventricular end-diastolic pressure was 6 mmHg. No  significant pressure gradient across the aortic valve upon pullback. Ejection fraction 50% to 55%. CORONARY ANGIOGRAM:  1. RIGHT CORONARY ARTERY:  Right coronary artery has significant  tortuosity in the proximal and mid segment. Proximal RCA has luminal  irregularities. Mid RCA has 10% to 20% mildly calcified lesions. Distal RCA with luminal irregularities. Codominant vessel. RPDA has  mild diffuse disease. 2.  LEFT MAIN CORONARY ARTERY:  Left main coronary artery is patent  without significant stenotic lesions. It bifurcates into left  circumflex and left anterior descending arteries. 3.  LEFT CIRCUMFLEX ARTERY:  Large, dominant vessel. Proximal LCX is  patent. Distal left circumflex artery into the LPL has 50% stenosis. 4.  LEFT ANTERIOR DESCENDING ARTERY:  Proximal LAD has a 95% severe  stenosis with findings consistent with probable ulcerated lesion. Mid  LAD has a 30% to 40% stenosis, nonobstructive. Distal LAD has mild  luminal irregularities. MEDICATIONS:  See MAR. COMPLICATIONS:  None. ESTIMATED BLOOD LOSS:  Less than 50 mL. ACCESS:  Right radial artery access. Vasc Band was applied. Hemostasis  was achieved. Resolute  [place] 3.5 x 22 mm drug-eluting stent was successfully deployed. This was  postdilated using a 3.5 mm x 8 mm noncompliant balloon. IMPRESSION:  1.  Non-ST-elevation myocardial infarction. 2.  Severe 95% stenosis of the proximal segment of left anterior  descending artery, status post successful PCI and stenting. RECOMMENDATIONS:  Continue to monitor on telemetry. Dual antiplatelet  therapy, high intensity statin therapy. Aggressive risk factor  modification. Obtain an echocardiogram.  IV fluids. Repeat labs in the  morning. Outpatient followup with Cardiology. Karen Juan MD     D: 02/04/2022 12:28:16           Conclusions    Summary   Normal left ventricle size and systolic function. Ejection fraction was   estimated at 55 %.  There were no regional left ventricular wall motion   abnormalities and wall thickness was within normal limits. The left atrium is Mildly dilated. Signature    ----------------------------------------------------------------   Electronically signed by Guicho Nails MD (Interpreting   physician) on 02/04/2022 at 06:44 PM   ----------------------------------------------------------------    Ekg 8/25/22  Marked sinus  Bradycardia 47  -Anterior infarct -age undetermined.    -Anterolateral ST-elevation -nondiagnostic -consider injury. ABNORMAL         Assessment     Diagnosis Orders   1. Coronary artery disease involving native coronary artery of native heart without angina pectoris        2. Primary hypertension  EKG 12 Lead      3. Pure hypercholesterolemia        4. S/P angioplasty with stent prox LAD feb 4, 2022              Recent admission DX feb 2022  Chest pain/sob- s/p [facility]   NSTEMI s/p [facility]  CAD s/p PCI to LAD  HTN-on lower side   HLD-total 160, trigs 172, LDL 84   DM2- per attending. EF 55%. Plan     Meds and labs reviewed    Continue the current treatment and with constant vigilance to changes in symptoms and also any potential side effects. Return for care or seek medical attention immediately if symptoms got worse and/or develop new symptoms. Coronary artery disease, seems to be stable. Denies angina or change in breathing pattern  S/p pci and stent feb 2022  Advised to be compliant with DAPT  Asa 81 once a day  Sinus bradycardia 47  Decrease lopressor 12.5 po bid from 25 bid      Hypertension, on medical treatment. Seems to be under good control. Patient is compliant with medical treatment. Hyperlipidemia: on statins, followed periodically. Patient need periodic lipid and liver profile. Noncompliance with meds advised      Hospital record reviewed    patient is advised to exercise 30 min s a day three times a week and about weight loss ,balance diet and     More fruits and vegetables .   target  to 130  Activity as tolerated    LP and LFT      Hx of CHIKIS not using CPAP and to see pulm soon    Discussed use, benefit, and side effects of prescribed medications. All patient questions answered. Pt voiced understanding. Instructed to continue current medications, diet and exercise. Continue risk factor modification and medical management. Patient agreed with treatment plan. Follow up as directed.     RTC in 6 months      [facility]

## 2022-08-30 LAB
ALBUMIN SERPL-MCNC: 4.6 G/DL (ref 3.5–5.2)
ALK PHOSPHATASE: 67 U/L (ref 40–125)
ALT SERPL-CCNC: 25 U/L (ref 5–50)
AST SERPL-CCNC: 17 U/L (ref 9–50)
BILIRUB SERPL-MCNC: 0.6 MG/DL
BILIRUBIN DIRECT: 0.2 MG/DL (ref 0–0.3)
CHOLESTEROL/HDL RATIO: 2.6 RATIO
CHOLESTEROL: 110 MG/DL
HDLC SERPL-MCNC: 43 MG/DL
LDL CHOLESTEROL CALCULATED: 48 MG/DL
LDL/HDL RATIO: 1.1 RATIO
TOTAL PROTEIN: 6.7 G/DL (ref 6.1–8.3)
TRIGL SERPL-MCNC: 94 MG/DL
VLDLC SERPL CALC-MCNC: 19 MG/DL

## 2022-09-06 ENCOUNTER — TELEPHONE (OUTPATIENT)
Dept: CARDIOLOGY CLINIC | Age: 67
End: 2022-09-06

## 2023-02-09 RX ORDER — LISINOPRIL 10 MG/1
10 TABLET ORAL DAILY
Qty: 90 TABLET | Refills: 0 | Status: SHIPPED | OUTPATIENT
Start: 2023-02-09

## 2023-02-09 NOTE — TELEPHONE ENCOUNTER
Yen Razo called requesting a refill on the following medications:  Requested Prescriptions     Pending Prescriptions Disp Refills    metoprolol tartrate (LOPRESSOR) 25 MG tablet 90 tablet 3     Sig: Take 0.5 tablets by mouth 2 times daily    lisinopril (PRINIVIL;ZESTRIL) 10 MG tablet 90 tablet 3     Sig: Take 1 tablet by mouth daily     Pharmacy verified: [facility] in [place]  .       Date of last visit: 8/25/2022  Date of next visit (if applicable): 8/85/6027

## 2023-02-24 ENCOUNTER — OFFICE VISIT (OUTPATIENT)
Dept: CARDIOLOGY CLINIC | Age: 68
End: 2023-02-24
Payer: MEDICARE

## 2023-02-24 VITALS
DIASTOLIC BLOOD PRESSURE: 87 MMHG | HEART RATE: 81 BPM | BODY MASS INDEX: 33.52 KG/M2 | WEIGHT: 239.4 LBS | SYSTOLIC BLOOD PRESSURE: 136 MMHG | HEIGHT: 71 IN

## 2023-02-24 DIAGNOSIS — R00.1 SINUS BRADYCARDIA: ICD-10-CM

## 2023-02-24 DIAGNOSIS — Z95.820 S/P ANGIOPLASTY WITH STENT: ICD-10-CM

## 2023-02-24 DIAGNOSIS — E78.00 PURE HYPERCHOLESTEROLEMIA: ICD-10-CM

## 2023-02-24 DIAGNOSIS — I10 PRIMARY HYPERTENSION: ICD-10-CM

## 2023-02-24 DIAGNOSIS — I25.10 CORONARY ARTERY DISEASE INVOLVING NATIVE CORONARY ARTERY OF NATIVE HEART WITHOUT ANGINA PECTORIS: Primary | ICD-10-CM

## 2023-02-24 PROCEDURE — G8484 FLU IMMUNIZE NO ADMIN: HCPCS | Performed by: INTERNAL MEDICINE

## 2023-02-24 PROCEDURE — G8427 DOCREV CUR MEDS BY ELIG CLIN: HCPCS | Performed by: INTERNAL MEDICINE

## 2023-02-24 PROCEDURE — 3017F COLORECTAL CA SCREEN DOC REV: CPT | Performed by: INTERNAL MEDICINE

## 2023-02-24 PROCEDURE — 4004F PT TOBACCO SCREEN RCVD TLK: CPT | Performed by: INTERNAL MEDICINE

## 2023-02-24 PROCEDURE — 99214 OFFICE O/P EST MOD 30 MIN: CPT | Performed by: INTERNAL MEDICINE

## 2023-02-24 PROCEDURE — 3075F SYST BP GE 130 - 139MM HG: CPT | Performed by: INTERNAL MEDICINE

## 2023-02-24 PROCEDURE — 1123F ACP DISCUSS/DSCN MKR DOCD: CPT | Performed by: INTERNAL MEDICINE

## 2023-02-24 PROCEDURE — 3079F DIAST BP 80-89 MM HG: CPT | Performed by: INTERNAL MEDICINE

## 2023-02-24 PROCEDURE — G8417 CALC BMI ABV UP PARAM F/U: HCPCS | Performed by: INTERNAL MEDICINE

## 2023-02-24 RX ORDER — LISINOPRIL 20 MG/1
20 TABLET ORAL DAILY
Qty: 90 TABLET | Refills: 3 | Status: SHIPPED | OUTPATIENT
Start: 2023-02-24

## 2023-02-24 NOTE — PROGRESS NOTES
Chief Complaint   Patient presents with    6 Month Follow-Up        Hx of   hospital  treatment forr NSTEMI and had cath and pci feb 2022      Pt here for 6 month follow up    Last EKG 8/25/22    Denied chest pain, sob, palpitations  or dizziness    Once in a while dizziness on standing fast    Run out of his meds for the last 2 days - last dose of brilinta 2 days back and missed yesterday dose    No leg edema    Feel stronger after pci    Past Surgical History:   Procedure Laterality Date    JOINT REPLACEMENT      Rt hip replaced about 3 weeks ago       No Known Allergies     Family History   Problem Relation Age of Onset    Cancer Mother     Cancer Father         Social History     Socioeconomic History    Marital status:      Spouse name: Not on file    Number of children: Not on file    Years of education: Not on file    Highest education level: Not on file   Occupational History    Not on file   Tobacco Use    Smoking status: Never    Smokeless tobacco: Current     Types: Chew    Tobacco comments:     pt has been using chew for about 40 years   Substance and Sexual Activity    Alcohol use: No    Drug use: Not Currently    Sexual activity: Not on file   Other Topics Concern    Not on file   Social History Narrative    Not on file     Social Determinants of Health     Financial Resource Strain: Low Risk     Difficulty of Paying Living Expenses: Not hard at all   Food Insecurity: No Food Insecurity    Worried About Running Out of Food in the Last Year: Never true    Ran Out of Food in the Last Year: Never true   Transportation Needs: Not on file   Physical Activity: Not on file   Stress: Not on file   Social Connections: Not on file   Intimate Partner Violence: Not on file   Housing Stability: Not on file       Current Outpatient Medications   Medication Sig Dispense Refill    metoprolol tartrate (LOPRESSOR) 25 MG tablet Take 0.5 tablets by mouth 2 times daily 90 tablet 0    lisinopril (PRINIVIL;ZESTRIL) 10 MG tablet Take 1 tablet by mouth daily 90 tablet 0    PARoxetine (PAXIL) 20 MG tablet Take 1 tablet by mouth every morning 30 tablet 0    ticagrelor (BRILINTA) 90 MG TABS tablet Take 1 tablet by mouth 2 times daily 180 tablet 3    rosuvastatin (CRESTOR) 40 MG tablet Take 1 tablet by mouth nightly 90 tablet 3    metFORMIN (GLUCOPHAGE-XR) 500 mg extended release tablet Take 1 tablet by mouth in the morning and at bedtime 180 tablet 2    aspirin 81 MG EC tablet Take 1 tablet by mouth daily (Patient taking differently: Take 81 mg by mouth daily 2 -325 mg daily) 30 tablet 3     No current facility-administered medications for this visit. Review of Systems -     General ROS: negative  Psychological ROS: negative  Hematological and Lymphatic ROS: No history of blood clots or bleeding disorder. Respiratory ROS: no cough,  or wheezing, the rest see HPI  Cardiovascular ROS: See HPI  Gastrointestinal ROS: negative  Genito-Urinary ROS: no dysuria, trouble voiding, or hematuria  Musculoskeletal ROS: negative  Neurological ROS: no TIA or stroke symptoms  Dermatological ROS: negative      Blood pressure (!) 149/87, pulse 90, height 5' 11\" (1.803 m), weight 239 lb 6.4 oz (108.6 kg).         Physical Examination:    General appearance - alert, well appearing, and in no distress  HEENT- Pink conjunctiva  , Non-icteri sclera,PERRLA  Mental status - alert, oriented to person, place, and time  Neck - supple, no significant adenopathy, no JVD, or carotid bruits  Chest - clear to auscultation, no wheezes, rales or rhonchi, symmetric air entry  Heart - normal rate, regular rhythm, normal S1, S2, no murmurs, rubs, clicks or gallops  Abdomen - soft, nontender, nondistended, no masses or organomegaly  MIKEY- no CVA or flank tenderness, no suprapubic tenderness  Neurological - alert, oriented, normal speech, no focal findings or movement disorder noted  Musculoskeletal/limbs - no joint tenderness, deformity or swelling   - peripheral pulses normal, no pedal edema, no clubbing or cyanosis  Skin - normal coloration and turgor, no rashes, no suspicious skin lesions noted  Psych- appropriate mood and affect    Lab  No results for input(s): CKTOTAL, CKMB, CKMBINDEX, TROPONINI in the last 72 hours. CBC:   Lab Results   Component Value Date/Time    WBC 6.3 02/05/2022 06:20 AM    RBC 4.54 02/05/2022 06:20 AM    RBC 5.13 12/29/2021 10:14 AM    HGB 13.7 02/05/2022 06:20 AM    HCT 41.6 02/05/2022 06:20 AM    MCV 91.6 02/05/2022 06:20 AM    MCH 30.2 02/05/2022 06:20 AM    MCHC 32.9 02/05/2022 06:20 AM    RDW 13.2 12/29/2021 10:14 AM     02/05/2022 06:20 AM    MPV 10.2 02/05/2022 06:20 AM     BMP:    Lab Results   Component Value Date/Time     02/05/2022 06:20 AM    K 4.6 02/05/2022 06:20 AM     02/05/2022 06:20 AM    CO2 22 02/05/2022 06:20 AM    BUN 16 02/05/2022 06:20 AM    LABALBU 4.6 08/29/2022 09:27 AM    CREATININE 0.8 02/05/2022 06:20 AM    CALCIUM 8.9 02/05/2022 06:20 AM    LABGLOM >90 02/05/2022 06:20 AM    GLUCOSE 133 02/05/2022 06:20 AM    GLUCOSE 150 12/29/2021 10:14 AM     Hepatic Function Panel:    Lab Results   Component Value Date/Time    ALKPHOS 67 08/29/2022 09:27 AM    ALKPHOS 132 02/05/2022 06:20 AM    ALT 25 08/29/2022 09:27 AM    AST 17 08/29/2022 09:27 AM    PROT 6.7 08/29/2022 09:27 AM    BILITOT 0.6 08/29/2022 09:27 AM    BILIDIR 0.2 08/29/2022 09:27 AM    LABALBU 4.6 08/29/2022 09:27 AM     Magnesium:  No results found for: MG  Warfarin PT/INR:  No components found for: PTPATWAR, PTINRWAR  HgBA1c:    Lab Results   Component Value Date/Time    LABA1C 7.0 06/13/2022 10:11 AM     FLP:    Lab Results   Component Value Date/Time    TRIG 94 08/29/2022 09:27 AM    HDL 43 08/29/2022 09:27 AM    LDLCALC 48 08/29/2022 09:27 AM    LABVLDL 19 08/29/2022 09:27 AM     TSH:  No results found for: TSH       FINDINGS:  HEMODYNAMICS:  Left ventricular end-diastolic pressure was 6 mmHg.   No  significant pressure gradient across the aortic valve upon pullback. Ejection fraction 50% to 55%. CORONARY ANGIOGRAM:  1. RIGHT CORONARY ARTERY:  Right coronary artery has significant  tortuosity in the proximal and mid segment. Proximal RCA has luminal  irregularities. Mid RCA has 10% to 20% mildly calcified lesions. Distal RCA with luminal irregularities. Codominant vessel. RPDA has  mild diffuse disease. 2.  LEFT MAIN CORONARY ARTERY:  Left main coronary artery is patent  without significant stenotic lesions. It bifurcates into left  circumflex and left anterior descending arteries. 3.  LEFT CIRCUMFLEX ARTERY:  Large, dominant vessel. Proximal LCX is  patent. Distal left circumflex artery into the LPL has 50% stenosis. 4.  LEFT ANTERIOR DESCENDING ARTERY:  Proximal LAD has a 95% severe  stenosis with findings consistent with probable ulcerated lesion. Mid  LAD has a 30% to 40% stenosis, nonobstructive. Distal LAD has mild  luminal irregularities. MEDICATIONS:  See MAR. COMPLICATIONS:  None. ESTIMATED BLOOD LOSS:  Less than 50 mL. ACCESS:  Right radial artery access. Vasc Band was applied. Hemostasis  was achieved. Resolute  Walker 3.5 x 22 mm drug-eluting stent was successfully deployed. This was  postdilated using a 3.5 mm x 8 mm noncompliant balloon. IMPRESSION:  1.  Non-ST-elevation myocardial infarction. 2.  Severe 95% stenosis of the proximal segment of left anterior  descending artery, status post successful PCI and stenting. RECOMMENDATIONS:  Continue to monitor on telemetry. Dual antiplatelet  therapy, high intensity statin therapy. Aggressive risk factor  modification. Obtain an echocardiogram.  IV fluids. Repeat labs in the  morning. Outpatient followup with Cardiology. Susie Ruffin MD     D: 02/04/2022 12:28:16           Conclusions    Summary   Normal left ventricle size and systolic function. Ejection fraction was   estimated at 55 %.  There were no regional left ventricular wall motion   abnormalities and wall thickness was within normal limits. The left atrium is Mildly dilated. Signature    ----------------------------------------------------------------   Electronically signed by Rosa Vargas MD (Interpreting   physician) on 02/04/2022 at 06:44 PM   ----------------------------------------------------------------    Ekg 8/25/22  Marked sinus  Bradycardia 47  -Anterior infarct -age undetermined.    -Anterolateral ST-elevation -nondiagnostic -consider injury. ABNORMAL         Assessment     Diagnosis Orders   1. Coronary artery disease involving native coronary artery of native heart without angina pectoris        2. Pure hypercholesterolemia        3. Primary hypertension        4. Sinus bradycardia        5. S/P angioplasty with stent prox LAD feb 4, 2022                The  admission DX feb 2022  Chest pain/sob- s/p [facility]   NSTEMI s/p [facility]  CAD s/p PCI to LAD  HTN-on lower side   HLD-total 160, trigs 172, LDL 84   DM2- per attending. EF 55%. Plan     The current Meds and labs reviewed    Continue the current treatment and with constant vigilance to changes in symptoms and also any potential side effects. Return for care or seek medical attention immediately if symptoms got worse and/or develop new symptoms. Coronary artery disease, seems to be stable. Denies angina or change in breathing pattern  S/p pci and stent feb 2022  Advised to be compliant with DAPT  Asa 81 once a day  Cont Brilinta  Hx of Sinus bradycardia 47- resolved after decreased lopressor  Cont  lopressor 12.5 po bid       Hypertension, on medical treatment. Seems to be under good control. Patient is compliant with medical treatment. Home  bp 140/85 to 150/ 90  Increase lisnopril 20 mg po qd from 10 mg po qd  Home advised      Hyperlipidemia: on statins, followed periodically. Patient need periodic lipid and liver profile.     patient is advised to exercise 30 min s a day three times a week and about weight loss ,balance diet and     More fruits and vegetables . Activity as tolerated    Hx of CHIKIS not using CPAP and to see pulm soon    LP and LFT to be done soon    Discussed use, benefit, and side effects of prescribed medications. All patient questions answered. Pt voiced understanding. Instructed to continue current medications, diet and exercise. Continue risk factor modification and medical management. Patient agreed with treatment plan. Follow up as directed.     RTC in 6 months      [facility]ordin AndresP[facility]

## 2023-03-07 PROBLEM — I20.8 OTHER FORMS OF ANGINA PECTORIS (HCC): Status: ACTIVE | Noted: 2023-03-07

## 2023-03-07 PROBLEM — I20.89 OTHER FORMS OF ANGINA PECTORIS: Status: ACTIVE | Noted: 2023-03-07

## 2023-04-20 RX ORDER — TICAGRELOR 90 MG/1
TABLET ORAL
Qty: 180 TABLET | Refills: 0 | Status: SHIPPED | OUTPATIENT
Start: 2023-04-20

## 2023-04-20 RX ORDER — ROSUVASTATIN CALCIUM 40 MG/1
40 TABLET, COATED ORAL NIGHTLY
Qty: 90 TABLET | Refills: 0 | Status: SHIPPED | OUTPATIENT
Start: 2023-04-20

## 2023-05-31 ENCOUNTER — OFFICE VISIT (OUTPATIENT)
Dept: PULMONOLOGY | Age: 68
End: 2023-05-31
Payer: MEDICARE

## 2023-05-31 VITALS
BODY MASS INDEX: 31.51 KG/M2 | SYSTOLIC BLOOD PRESSURE: 124 MMHG | WEIGHT: 232.6 LBS | HEART RATE: 54 BPM | HEIGHT: 72 IN | OXYGEN SATURATION: 96 % | DIASTOLIC BLOOD PRESSURE: 70 MMHG

## 2023-05-31 DIAGNOSIS — I10 ESSENTIAL HYPERTENSION: ICD-10-CM

## 2023-05-31 DIAGNOSIS — G25.81 RLS (RESTLESS LEGS SYNDROME): ICD-10-CM

## 2023-05-31 DIAGNOSIS — G47.33 OSA ON CPAP: Primary | ICD-10-CM

## 2023-05-31 DIAGNOSIS — Z99.89 OSA ON CPAP: Primary | ICD-10-CM

## 2023-05-31 DIAGNOSIS — E66.09 CLASS 1 OBESITY DUE TO EXCESS CALORIES WITHOUT SERIOUS COMORBIDITY WITH BODY MASS INDEX (BMI) OF 31.0 TO 31.9 IN ADULT: ICD-10-CM

## 2023-05-31 PROCEDURE — 1123F ACP DISCUSS/DSCN MKR DOCD: CPT | Performed by: NURSE PRACTITIONER

## 2023-05-31 PROCEDURE — G8427 DOCREV CUR MEDS BY ELIG CLIN: HCPCS | Performed by: NURSE PRACTITIONER

## 2023-05-31 PROCEDURE — 3078F DIAST BP <80 MM HG: CPT | Performed by: NURSE PRACTITIONER

## 2023-05-31 PROCEDURE — 3074F SYST BP LT 130 MM HG: CPT | Performed by: NURSE PRACTITIONER

## 2023-05-31 PROCEDURE — 99214 OFFICE O/P EST MOD 30 MIN: CPT | Performed by: NURSE PRACTITIONER

## 2023-05-31 PROCEDURE — 3017F COLORECTAL CA SCREEN DOC REV: CPT | Performed by: NURSE PRACTITIONER

## 2023-05-31 PROCEDURE — G8417 CALC BMI ABV UP PARAM F/U: HCPCS | Performed by: NURSE PRACTITIONER

## 2023-05-31 PROCEDURE — 4004F PT TOBACCO SCREEN RCVD TLK: CPT | Performed by: NURSE PRACTITIONER

## 2023-05-31 RX ORDER — GABAPENTIN 300 MG/1
300 CAPSULE ORAL NIGHTLY
Qty: 90 CAPSULE | Refills: 1 | Status: SHIPPED | OUTPATIENT
Start: 2023-05-31 | End: 2023-11-27

## 2023-05-31 ASSESSMENT — ENCOUNTER SYMPTOMS
DIARRHEA: 0
NAUSEA: 0
WHEEZING: 0
COUGH: 0
VOMITING: 0
SHORTNESS OF BREATH: 0
EYES NEGATIVE: 1
ABDOMINAL PAIN: 0

## 2023-05-31 NOTE — PROGRESS NOTES
[place] [facility]      Leonid Mccoy         [id number]  5/31/2023   Chief Complaint   Patient presents with    Follow-up     1 year CHIKIS follow up with download, titration 8/7/22. Pt of Dr. Florecita REDDY Download:   Original or initial AHI: 15.7     Date of initial study: 6/27/2008    Compliant  90%   Noncompliant 7%     PAP Type CPAP Level  89amJ56   Avg Hrs/Day 6 hours 38 minutes  AHI: 3.5   Leaks : 95 th percentile: 24.0   Recorded compliance dates 4/30/23-5/29/23  Machine/Mfg:   [x] ResMed    [] Respironics/Dreamstation   Interface:   [] Nasal    [] Nasal pillows   [x] FFM      Provider:      [x] -SHELBY     []Gabriela     [] Tresa    [] Rickey Horan    [] Schwiet[place]ans               [] [employer]      [] Adaptive    [] [address]      [] Other    Neck Size: 17.25 inches  Mallampati 4  ESS:  4  SAQLI: 87    Here is a scan of the most recent download:                      Presentation:   Nickolas Donnelly presents for 1 yearsle medicine follow up for obstructive sleep apnea  Since the last visit, Nickolas Donnelly went for baseline PSG and then titration . Now on CPAP 12 cm H20   Does not like the heated air . Is noticing feeling more rested most nights. Progress History:   Since last visit any new medical issues? No  Any trouble with Machine No  Any new sleep medicines? No  Trouble Falling Asleep No  Trouble Staying Asleep yes  most nights   Has taken Ambien in past with benefit   Previously on Klonopin for PLMS    Equipment issues: The pressure is  acceptable, the mask is acceptable     Review of Systems -   Review of Systems   Constitutional:  Negative for activity change, appetite change, chills, fatigue, fever and unexpected weight change. HENT: Negative. Eyes: Negative. Respiratory:  Negative for cough, shortness of breath and wheezing. Cardiovascular:  Negative for chest pain, palpitations and leg swelling.    Gastrointestinal:  Negative for abdominal pain,

## 2023-08-07 RX ORDER — ROSUVASTATIN CALCIUM 40 MG/1
40 TABLET, COATED ORAL NIGHTLY
Qty: 90 TABLET | Refills: 0 | Status: SHIPPED | OUTPATIENT
Start: 2023-08-07

## 2023-08-07 NOTE — TELEPHONE ENCOUNTER
Gabrielle Phalen called requesting a refill on the following medications:  Requested Prescriptions     Pending Prescriptions Disp Refills    rosuvastatin (CRESTOR) 40 MG tablet 90 tablet 0     Sig: Take 1 tablet by mouth nightly    ticagrelor (BRILINTA) 90 MG TABS tablet 180 tablet 0     Sig: Take 1 tablet by mouth 2 times daily     Pharmacy verified: [facility]   . pv      Date of last visit: 8/25/2022  Date of next visit (if applicable): 8/42/0214

## 2023-08-07 NOTE — TELEPHONE ENCOUNTER
Skye Vargas called requesting a refill on the following medications:  Requested Prescriptions     Pending Prescriptions Disp Refills    metoprolol tartrate (LOPRESSOR) 25 MG tablet 90 tablet 0     Sig: Take 0.5 tablets by mouth 2 times daily     Pharmacy verified:[facility] [place]  . ted      Date of last visit: 2-24-23  Date of next visit (if applicable): 8/93/9623

## 2023-08-18 ENCOUNTER — OFFICE VISIT (OUTPATIENT)
Dept: CARDIOLOGY CLINIC | Age: 68
End: 2023-08-18
Payer: MEDICARE

## 2023-08-18 VITALS
BODY MASS INDEX: 31.02 KG/M2 | HEIGHT: 72 IN | HEART RATE: 51 BPM | WEIGHT: 229 LBS | SYSTOLIC BLOOD PRESSURE: 111 MMHG | DIASTOLIC BLOOD PRESSURE: 71 MMHG

## 2023-08-18 DIAGNOSIS — I10 PRIMARY HYPERTENSION: ICD-10-CM

## 2023-08-18 DIAGNOSIS — I25.10 CORONARY ARTERY DISEASE INVOLVING NATIVE CORONARY ARTERY OF NATIVE HEART WITHOUT ANGINA PECTORIS: Primary | ICD-10-CM

## 2023-08-18 DIAGNOSIS — Z95.820 S/P ANGIOPLASTY WITH STENT: ICD-10-CM

## 2023-08-18 DIAGNOSIS — R00.1 SINUS BRADYCARDIA: ICD-10-CM

## 2023-08-18 DIAGNOSIS — E78.00 PURE HYPERCHOLESTEROLEMIA: ICD-10-CM

## 2023-08-18 PROBLEM — I20.8 OTHER FORMS OF ANGINA PECTORIS (HCC): Status: RESOLVED | Noted: 2023-03-07 | Resolved: 2023-08-18

## 2023-08-18 PROBLEM — I20.89 OTHER FORMS OF ANGINA PECTORIS: Status: RESOLVED | Noted: 2023-03-07 | Resolved: 2023-08-18

## 2023-08-18 PROCEDURE — 93000 ELECTROCARDIOGRAM COMPLETE: CPT | Performed by: INTERNAL MEDICINE

## 2023-08-18 PROCEDURE — 3078F DIAST BP <80 MM HG: CPT | Performed by: INTERNAL MEDICINE

## 2023-08-18 PROCEDURE — 4004F PT TOBACCO SCREEN RCVD TLK: CPT | Performed by: INTERNAL MEDICINE

## 2023-08-18 PROCEDURE — G8417 CALC BMI ABV UP PARAM F/U: HCPCS | Performed by: INTERNAL MEDICINE

## 2023-08-18 PROCEDURE — 1123F ACP DISCUSS/DSCN MKR DOCD: CPT | Performed by: INTERNAL MEDICINE

## 2023-08-18 PROCEDURE — 3074F SYST BP LT 130 MM HG: CPT | Performed by: INTERNAL MEDICINE

## 2023-08-18 PROCEDURE — 3017F COLORECTAL CA SCREEN DOC REV: CPT | Performed by: INTERNAL MEDICINE

## 2023-08-18 PROCEDURE — 99214 OFFICE O/P EST MOD 30 MIN: CPT | Performed by: INTERNAL MEDICINE

## 2023-08-18 PROCEDURE — G8427 DOCREV CUR MEDS BY ELIG CLIN: HCPCS | Performed by: INTERNAL MEDICINE

## 2023-08-18 NOTE — PROGRESS NOTES
medical attention immediately if symptoms got worse and/or develop new symptoms. Coronary artery disease, seems to be stable. Denies angina or change in breathing pattern  S/p pci and stent feb 2022  Advised to be compliant with DAPT  Asa 81 once a day  Cont Brilinta  Hx of Sinus bradycardia 47- resolved after decreased lopressor  Decrease   lopressor 12.5 po  qd from BID      Hypertension, on medical treatment. Seems to be under good control. Patient is compliant with medical treatment. Home  bp  110 to 126  Cont lisnopril 20 mg po qd   Home advised      Hyperlipidemia: on statins, followed periodically. Patient need periodic lipid and liver profile. patient is advised to exercise 30 min s a day three times a week and about weight loss ,balance diet and     More fruits and vegetables . Activity as tolerated    Hx of CHIKIS not using CPAP and to see pulm soon    LP and LFT to be done soon    patient is advised to exercise 30 min s a day three times a week and about weight loss ,balance diet and     More fruits and vegetables . Discussed use, benefit, and side effects of prescribed medications. All patient questions answered. Pt voiced understanding. Instructed to continue current medications, diet and exercise. Continue risk factor modification and medical management. Patient agreed with treatment plan. Follow up as directed. The patient is advised to attempt to improve diet and exercise patterns to aid in medical management of this problem. Advised more plant based nutrition/meditarrean diet   Advised patient to call office or seek immediate medical attention if there is any new onset of  any chest pain, sob, palpitations, lightheadedness, dizziness, orthopnea, PND or pedal edema. All medication side effects were discussed in details.       RTC in 6 months      [facility]

## 2023-08-24 LAB
ALBUMIN SERPL-MCNC: 4.6 G/DL (ref 3.5–5.2)
ALK PHOSPHATASE: 67 U/L (ref 40–125)
ALT SERPL-CCNC: 26 U/L (ref 5–50)
ANION GAP SERPL CALCULATED.3IONS-SCNC: 11 MEQ/L (ref 7–16)
AST SERPL-CCNC: 17 U/L (ref 9–50)
BILIRUB SERPL-MCNC: 0.4 MG/DL
BILIRUBIN DIRECT: <0.2 MG/DL (ref 0–0.3)
BUN BLDV-MCNC: 17 MG/DL (ref 8–23)
CALCIUM SERPL-MCNC: 9.4 MG/DL (ref 8.5–10.5)
CHLORIDE BLD-SCNC: 103 MEQ/L (ref 95–107)
CHOLESTEROL/HDL RATIO: 2.9 RATIO
CHOLESTEROL: 108 MG/DL
CO2: 25 MEQ/L (ref 19–31)
CREAT SERPL-MCNC: 0.99 MG/DL (ref 0.8–1.4)
EGFR IF NONAFRICAN AMERICAN: 82 ML/MIN/1.73
GLUCOSE: 171 MG/DL (ref 70–99)
HDLC SERPL-MCNC: 37 MG/DL
LDL CHOLESTEROL CALCULATED: 46 MG/DL
LDL/HDL RATIO: 1.2 RATIO
MAGNESIUM: 2 MG/DL (ref 1.6–2.6)
POTASSIUM SERPL-SCNC: 4.5 MEQ/L (ref 3.5–5.4)
SODIUM BLD-SCNC: 139 MEQ/L (ref 133–146)
TOTAL PROTEIN: 6.7 G/DL (ref 6.1–8.3)
TRIGL SERPL-MCNC: 126 MG/DL
VLDLC SERPL CALC-MCNC: 25 MG/DL

## 2023-08-30 ENCOUNTER — OFFICE VISIT (OUTPATIENT)
Dept: PULMONOLOGY | Age: 68
End: 2023-08-30
Payer: MEDICARE

## 2023-08-30 VITALS
TEMPERATURE: 98.2 F | SYSTOLIC BLOOD PRESSURE: 118 MMHG | HEIGHT: 72 IN | HEART RATE: 52 BPM | WEIGHT: 232.4 LBS | OXYGEN SATURATION: 99 % | BODY MASS INDEX: 31.48 KG/M2 | DIASTOLIC BLOOD PRESSURE: 62 MMHG

## 2023-08-30 DIAGNOSIS — G25.81 RLS (RESTLESS LEGS SYNDROME): ICD-10-CM

## 2023-08-30 DIAGNOSIS — E66.09 CLASS 1 OBESITY DUE TO EXCESS CALORIES WITHOUT SERIOUS COMORBIDITY WITH BODY MASS INDEX (BMI) OF 31.0 TO 31.9 IN ADULT: ICD-10-CM

## 2023-08-30 DIAGNOSIS — G47.00 INSOMNIA, UNSPECIFIED TYPE: Primary | ICD-10-CM

## 2023-08-30 DIAGNOSIS — Z99.89 OSA ON CPAP: ICD-10-CM

## 2023-08-30 DIAGNOSIS — F32.A DEPRESSION, UNSPECIFIED DEPRESSION TYPE: ICD-10-CM

## 2023-08-30 DIAGNOSIS — G47.33 OSA ON CPAP: ICD-10-CM

## 2023-08-30 PROBLEM — M17.0 ARTHRITIS OF BOTH KNEES: Status: ACTIVE | Noted: 2023-08-30

## 2023-08-30 PROBLEM — M16.11 PRIMARY OSTEOARTHRITIS OF RIGHT HIP: Status: ACTIVE | Noted: 2023-08-30

## 2023-08-30 PROBLEM — M16.10 PRIMARY LOCALIZED OSTEOARTHRITIS OF PELVIC REGION AND THIGH: Status: ACTIVE | Noted: 2023-08-30

## 2023-08-30 PROBLEM — Z96.641 PRESENCE OF RIGHT ARTIFICIAL HIP JOINT: Status: ACTIVE | Noted: 2023-08-30

## 2023-08-30 PROCEDURE — 3017F COLORECTAL CA SCREEN DOC REV: CPT | Performed by: NURSE PRACTITIONER

## 2023-08-30 PROCEDURE — 99214 OFFICE O/P EST MOD 30 MIN: CPT | Performed by: NURSE PRACTITIONER

## 2023-08-30 PROCEDURE — 3074F SYST BP LT 130 MM HG: CPT | Performed by: NURSE PRACTITIONER

## 2023-08-30 PROCEDURE — 1123F ACP DISCUSS/DSCN MKR DOCD: CPT | Performed by: NURSE PRACTITIONER

## 2023-08-30 PROCEDURE — G8427 DOCREV CUR MEDS BY ELIG CLIN: HCPCS | Performed by: NURSE PRACTITIONER

## 2023-08-30 PROCEDURE — 4004F PT TOBACCO SCREEN RCVD TLK: CPT | Performed by: NURSE PRACTITIONER

## 2023-08-30 PROCEDURE — 3078F DIAST BP <80 MM HG: CPT | Performed by: NURSE PRACTITIONER

## 2023-08-30 PROCEDURE — G8417 CALC BMI ABV UP PARAM F/U: HCPCS | Performed by: NURSE PRACTITIONER

## 2023-08-30 RX ORDER — TRAZODONE HYDROCHLORIDE 100 MG/1
100 TABLET ORAL NIGHTLY PRN
Qty: 90 TABLET | Refills: 1 | Status: SHIPPED | OUTPATIENT
Start: 2023-08-30

## 2023-08-30 RX ORDER — GABAPENTIN 300 MG/1
300 CAPSULE ORAL NIGHTLY
Qty: 90 CAPSULE | Refills: 1 | Status: SHIPPED | OUTPATIENT
Start: 2023-08-30 | End: 2024-02-26

## 2023-08-30 ASSESSMENT — ENCOUNTER SYMPTOMS
VOMITING: 0
WHEEZING: 0
DIARRHEA: 0
COUGH: 0
ABDOMINAL PAIN: 0
SHORTNESS OF BREATH: 0
NAUSEA: 0
EYES NEGATIVE: 1

## 2023-08-30 NOTE — PROGRESS NOTES
[facility]      Victor M Riding         [id number]  8/30/2023   Chief Complaint   Patient presents with    Follow-up     CHIKIS 3 month f/u with [facility] download. Pt of Dr. Porsche Stoddard    PAP Download:   Original or initial AHI: 15.7     Date of initial study: 6/27/08      Compliant  77%     Noncompliant 7 %     PAP Type CPAP Level  12   Avg Hrs/Day 6hrs 2mins  AHI: 2.8   Leaks : 95 th percentile: 34.3   Recorded compliance dates , 7/30/23  to 8/28/23   Machine/Mfg:   [] ResMed    [] Respironics/Dreamstation   Interface:   [] Nasal    [] Nasal pillows   [x] FFM      Provider:      [x] KEY     []Gabriela     [] Tresa    [] Pita Mares    [] Kathe               [] [employer]      [] Adaptive    [] [address]:      [] Other    Neck Size: 17.25  Mallampati 4  ESS:  7  SAQLI: 53    Here is a scan of the most recent download:                      Presentation:   [facility]  Since the last visit, Roger Rizvi started taking gabapentin 300 mg p.o. nightly for restless leg  He is reports \"maybe my legs are not as restless but still very tired in the mornings \"   Requiring a nap everyday, is helpful but will interfere with sleep at night. Continues to use his CPAP with good compliance . Few nights on this current download showing no use, he reports machine would not turn on.  Machine is < 3years old     [address] [place] [address]:   Reported degree of insomnia: moderate  Insomnia episode began: more than 1 month ago  Insomnia episode progress: unchanged  Sleep patterns during the week:     Circadian rhythm: morning type     Routine before bed: bath/shower and teeth brushing      Bedroom environment: comfortable and quiet      Typical bedtime during the week: 10-11 pm    Time to fall asleep: 15 minutes    Number of nighttime awakenings: one to two    Difficulty going back to sleep: No      Activity when awakened: uses

## 2023-11-21 NOTE — TELEPHONE ENCOUNTER
Rolando Tamayo called requesting a refill on the following medications:  Requested Prescriptions     Pending Prescriptions Disp Refills    rosuvastatin (CRESTOR) 40 MG tablet 90 tablet 0     Sig: Take 1 tablet by mouth nightly    lisinopril (PRINIVIL;ZESTRIL) 20 MG tablet 90 tablet 3     Sig: Take 1 tablet by mouth daily    ticagrelor (BRILINTA) 90 MG TABS tablet 180 tablet 0     Sig: Take 1 tablet by mouth 2 times daily    metoprolol tartrate (LOPRESSOR) 25 MG tablet 45 tablet 3     Sig: Take 0.5 tablets by mouth at bedtime     Pharmacy verified:  .  [address] [phone] Rhoda Fowler [phone]    Date of last visit: 08/18/2023  Date of next visit (if applicable): 7/90/0918

## 2023-11-22 RX ORDER — ROSUVASTATIN CALCIUM 40 MG/1
40 TABLET, COATED ORAL NIGHTLY
Qty: 90 TABLET | Refills: 0 | Status: SHIPPED | OUTPATIENT
Start: 2023-11-22

## 2023-11-22 RX ORDER — LISINOPRIL 20 MG/1
20 TABLET ORAL DAILY
Qty: 90 TABLET | Refills: 3 | Status: SHIPPED | OUTPATIENT
Start: 2023-11-22

## 2024-01-03 NOTE — PROGRESS NOTES
[place] [facility]      Casper Gonzalez         [id number]  1/4/2024   Chief Complaint   Patient presents with    Follow-up     3mo CHIKIS and Insomnia f/u w/[facility] download and med check.  Doing well.  Needs script for PAP supplies.        Pt of Dr. Veronica     PAP Download:   Original or initial AHI: 15.7     Date of initial study: 6/27/2008      Compliant  93%     Noncompliant 7 %     PAP Type CPAP    Level  12 cmH2O   Avg Hrs/Day 7hrs 35mins  AHI: 2.5   Leaks : 95 th percentile: 67.8   Recorded compliance dates , 11/28/23  to 12/27/23   Machine/Mfg:   [x] ResMed    [] Respironics/Dreamstation   Interface:   [] Nasal    [] Nasal pillows   [x] FFM      Provider:      [x] [facility]     [][employer]     [] [employer]    [] [employer]    [] Yvesietermans               [] [employer]      [] Adaptive    [] [place]:      [] Other    Neck Size: 17.25 inches  Mallampati 4  ESS:  2 (improved from 7 )   SAQLI: 93 ( improved from 53)     Here is a scan of the most recent download:                      Presentation:   Casper presents for 3 monthssleep medicine follow up for obstructive sleep apnea, insomnia  Since the last visit, Casper   Continues to take gabapentin 300 mg PO nightly for RLS- controlled   Started trazodone 100 mg PO nightly for insomnia with improvement , denies side effects   Got new style of FFM- helped his sleep   Wakes up with dry mouth, is mouth breather, has tried chin strap     Progress History:   Since last visit any new medical issues? No  Any trouble with Machine No  Any new sleep medicines? No   Trouble Falling Asleep No  Trouble Staying Asleep No  Snoring no    Equipment issues:  The pressure is  acceptable, the mask is acceptable     Review of Systems -   Review of Systems   HENT:          Dry mouth    All other systems reviewed and are negative.       Physical Exam:    BMI:  Body mass index is 32.33 kg/m².    Wt Readings from Last 3 Encounters:   01/04/24 108.1 kg (238 lb

## 2024-01-04 ENCOUNTER — OFFICE VISIT (OUTPATIENT)
Dept: PULMONOLOGY | Age: 69
End: 2024-01-04
Payer: MEDICARE

## 2024-01-04 VITALS
OXYGEN SATURATION: 97 % | BODY MASS INDEX: 32.29 KG/M2 | DIASTOLIC BLOOD PRESSURE: 70 MMHG | HEIGHT: 72 IN | SYSTOLIC BLOOD PRESSURE: 126 MMHG | HEART RATE: 52 BPM | WEIGHT: 238.4 LBS | TEMPERATURE: 97.6 F

## 2024-01-04 DIAGNOSIS — G25.81 RLS (RESTLESS LEGS SYNDROME): ICD-10-CM

## 2024-01-04 DIAGNOSIS — G47.33 OSA ON CPAP: Primary | ICD-10-CM

## 2024-01-04 DIAGNOSIS — G47.00 INSOMNIA, UNSPECIFIED TYPE: ICD-10-CM

## 2024-01-04 DIAGNOSIS — E66.09 CLASS 1 OBESITY DUE TO EXCESS CALORIES WITHOUT SERIOUS COMORBIDITY WITH BODY MASS INDEX (BMI) OF 31.0 TO 31.9 IN ADULT: ICD-10-CM

## 2024-01-04 PROCEDURE — 99214 OFFICE O/P EST MOD 30 MIN: CPT | Performed by: NURSE PRACTITIONER

## 2024-01-04 PROCEDURE — 4004F PT TOBACCO SCREEN RCVD TLK: CPT | Performed by: NURSE PRACTITIONER

## 2024-01-04 PROCEDURE — G8484 FLU IMMUNIZE NO ADMIN: HCPCS | Performed by: NURSE PRACTITIONER

## 2024-01-04 PROCEDURE — 3074F SYST BP LT 130 MM HG: CPT | Performed by: NURSE PRACTITIONER

## 2024-01-04 PROCEDURE — G8427 DOCREV CUR MEDS BY ELIG CLIN: HCPCS | Performed by: NURSE PRACTITIONER

## 2024-01-04 PROCEDURE — 3078F DIAST BP <80 MM HG: CPT | Performed by: NURSE PRACTITIONER

## 2024-01-04 PROCEDURE — 3017F COLORECTAL CA SCREEN DOC REV: CPT | Performed by: NURSE PRACTITIONER

## 2024-01-04 PROCEDURE — 1123F ACP DISCUSS/DSCN MKR DOCD: CPT | Performed by: NURSE PRACTITIONER

## 2024-01-04 PROCEDURE — G8417 CALC BMI ABV UP PARAM F/U: HCPCS | Performed by: NURSE PRACTITIONER

## 2024-01-04 RX ORDER — TRAZODONE HYDROCHLORIDE 100 MG/1
100 TABLET ORAL NIGHTLY PRN
Qty: 90 TABLET | Refills: 3 | Status: SHIPPED | OUTPATIENT
Start: 2024-01-04

## 2024-03-12 NOTE — TELEPHONE ENCOUNTER
Casper Gonzalez called requesting a refill on the following medications:  Requested Prescriptions     Pending Prescriptions Disp Refills    rosuvastatin (CRESTOR) 40 MG tablet 90 tablet 0     Sig: Take 1 tablet by mouth nightly    ticagrelor (BRILINTA) 90 MG TABS tablet 180 tablet 0     Sig: Take 1 tablet by mouth 2 times daily     Pharmacy verified: [facility] in [place]  .pv      Date of last visit: 8/18/2023  Date of next visit (if applicable): 3/14/2024

## 2024-03-13 ENCOUNTER — TELEPHONE (OUTPATIENT)
Dept: PULMONOLOGY | Age: 69
End: 2024-03-13

## 2024-03-13 DIAGNOSIS — G25.81 RLS (RESTLESS LEGS SYNDROME): ICD-10-CM

## 2024-03-13 RX ORDER — GABAPENTIN 300 MG/1
300 CAPSULE ORAL NIGHTLY
Qty: 30 CAPSULE | Refills: 11 | Status: SHIPPED | OUTPATIENT
Start: 2024-03-13 | End: 2025-03-08

## 2024-03-13 NOTE — TELEPHONE ENCOUNTER
Patient reports that they need a refill for Gabapentin     Last office appointment 01/04/24    Patient is scheduled for follow-up in office on 01/09/25.    Last seen by Emelia Roy CNP    Medication refill routed to established provider Emelia Roy CNP     Patient requests 30 day supply.     Preferred pharmacy is [facility]

## 2024-03-14 ENCOUNTER — OFFICE VISIT (OUTPATIENT)
Dept: CARDIOLOGY CLINIC | Age: 69
End: 2024-03-14
Payer: MEDICARE

## 2024-03-14 VITALS
BODY MASS INDEX: 31.4 KG/M2 | DIASTOLIC BLOOD PRESSURE: 74 MMHG | WEIGHT: 231.8 LBS | HEART RATE: 56 BPM | HEIGHT: 72 IN | SYSTOLIC BLOOD PRESSURE: 122 MMHG

## 2024-03-14 DIAGNOSIS — I10 PRIMARY HYPERTENSION: ICD-10-CM

## 2024-03-14 DIAGNOSIS — E78.00 PURE HYPERCHOLESTEROLEMIA: ICD-10-CM

## 2024-03-14 DIAGNOSIS — I25.10 CORONARY ARTERY DISEASE INVOLVING NATIVE CORONARY ARTERY OF NATIVE HEART WITHOUT ANGINA PECTORIS: Primary | ICD-10-CM

## 2024-03-14 DIAGNOSIS — Z95.820 S/P ANGIOPLASTY WITH STENT: ICD-10-CM

## 2024-03-14 DIAGNOSIS — R00.1 SINUS BRADYCARDIA: ICD-10-CM

## 2024-03-14 PROCEDURE — 4004F PT TOBACCO SCREEN RCVD TLK: CPT | Performed by: INTERNAL MEDICINE

## 2024-03-14 PROCEDURE — G8417 CALC BMI ABV UP PARAM F/U: HCPCS | Performed by: INTERNAL MEDICINE

## 2024-03-14 PROCEDURE — 99214 OFFICE O/P EST MOD 30 MIN: CPT | Performed by: INTERNAL MEDICINE

## 2024-03-14 PROCEDURE — 3078F DIAST BP <80 MM HG: CPT | Performed by: INTERNAL MEDICINE

## 2024-03-14 PROCEDURE — G8484 FLU IMMUNIZE NO ADMIN: HCPCS | Performed by: INTERNAL MEDICINE

## 2024-03-14 PROCEDURE — 3017F COLORECTAL CA SCREEN DOC REV: CPT | Performed by: INTERNAL MEDICINE

## 2024-03-14 PROCEDURE — 3074F SYST BP LT 130 MM HG: CPT | Performed by: INTERNAL MEDICINE

## 2024-03-14 PROCEDURE — G8427 DOCREV CUR MEDS BY ELIG CLIN: HCPCS | Performed by: INTERNAL MEDICINE

## 2024-03-14 PROCEDURE — 1123F ACP DISCUSS/DSCN MKR DOCD: CPT | Performed by: INTERNAL MEDICINE

## 2024-03-14 NOTE — PROGRESS NOTES
effects.  Return for care or seek medical attention immediately if symptoms got worse and/or develop new symptoms.    Coronary artery disease, seems to be stable. Denies angina or change in breathing pattern  S/p pci and stent feb 2022  Advised to be compliant with DAPT  Asa 81 once a day  Cont Brilinta  Hx of Sinus bradycardia 47- resolved after decreased lopressor  Cont    lopressor 12.5 po  qd from BID      Hypertension, on medical treatment. Seems to be under good control. Patient is compliant with medical treatment.  Home  bp  110 to 126  Cont lisnopril 20 mg po qd   Home advised      Hyperlipidemia: on statins, followed periodically. Patient need periodic lipid and liver profile.    patient is advised to exercise 30 min s a day three times a week and about weight loss ,balance diet and     More fruits and vegetables .  Activity as tolerated    Hx of CHIKIS not using CPAP and to see pulm soon    patient is advised to exercise 30 min s a day three times a week and about weight loss ,balance diet and     More fruits and vegetables .    Discussed use, benefit, and side effects of prescribed medications. All patient questions answered. Pt voiced understanding. Instructed to continue current medications, diet and exercise. Continue risk factor modification and medical management. Patient agreed with treatment plan. Follow up as directed.      The patient is advised to attempt to improve diet and exercise patterns to aid in medical management of this problem.  Advised more plant based nutrition/meditarrean diet   Advised patient to call office or seek immediate medical attention if there is any new onset of  any chest pain, sob, palpitations, lightheadedness, dizziness, orthopnea, PND or pedal edema.   All medication side effects were discussed in details.      RTC in 6 months      Jessica Hu MD,MD,FACC

## 2024-03-15 RX ORDER — ROSUVASTATIN CALCIUM 40 MG/1
40 TABLET, COATED ORAL NIGHTLY
Qty: 90 TABLET | Refills: 0 | Status: SHIPPED | OUTPATIENT
Start: 2024-03-15

## 2024-07-23 NOTE — TELEPHONE ENCOUNTER
Casper Gonzalez called requesting a refill on the following medications:  Requested Prescriptions     Pending Prescriptions Disp Refills    ticagrelor (BRILINTA) 90 MG TABS tablet 180 tablet 0     Sig: Take 1 tablet by mouth 2 times daily     Pharmacy verified:  [facility] [place]      Date of last visit: 03-14-24  Date of next visit (if applicable): 9/12/2024

## 2024-09-12 ENCOUNTER — OFFICE VISIT (OUTPATIENT)
Dept: CARDIOLOGY CLINIC | Age: 69
End: 2024-09-12
Payer: MEDICARE

## 2024-09-12 VITALS
HEIGHT: 72 IN | SYSTOLIC BLOOD PRESSURE: 126 MMHG | DIASTOLIC BLOOD PRESSURE: 75 MMHG | WEIGHT: 222.31 LBS | BODY MASS INDEX: 30.11 KG/M2 | HEART RATE: 50 BPM

## 2024-09-12 DIAGNOSIS — E78.00 PURE HYPERCHOLESTEROLEMIA: ICD-10-CM

## 2024-09-12 DIAGNOSIS — I25.10 CORONARY ARTERY DISEASE INVOLVING NATIVE CORONARY ARTERY OF NATIVE HEART WITHOUT ANGINA PECTORIS: Primary | ICD-10-CM

## 2024-09-12 DIAGNOSIS — Z95.820 S/P ANGIOPLASTY WITH STENT: ICD-10-CM

## 2024-09-12 DIAGNOSIS — R00.1 SINUS BRADYCARDIA: ICD-10-CM

## 2024-09-12 DIAGNOSIS — I10 PRIMARY HYPERTENSION: ICD-10-CM

## 2024-09-12 PROCEDURE — 99214 OFFICE O/P EST MOD 30 MIN: CPT | Performed by: INTERNAL MEDICINE

## 2024-09-12 PROCEDURE — G8417 CALC BMI ABV UP PARAM F/U: HCPCS | Performed by: INTERNAL MEDICINE

## 2024-09-12 PROCEDURE — 3078F DIAST BP <80 MM HG: CPT | Performed by: INTERNAL MEDICINE

## 2024-09-12 PROCEDURE — 3074F SYST BP LT 130 MM HG: CPT | Performed by: INTERNAL MEDICINE

## 2024-09-12 PROCEDURE — 93000 ELECTROCARDIOGRAM COMPLETE: CPT | Performed by: INTERNAL MEDICINE

## 2024-09-12 PROCEDURE — 1123F ACP DISCUSS/DSCN MKR DOCD: CPT | Performed by: INTERNAL MEDICINE

## 2024-09-12 PROCEDURE — G8427 DOCREV CUR MEDS BY ELIG CLIN: HCPCS | Performed by: INTERNAL MEDICINE

## 2024-09-12 PROCEDURE — 4004F PT TOBACCO SCREEN RCVD TLK: CPT | Performed by: INTERNAL MEDICINE

## 2024-09-12 PROCEDURE — 3017F COLORECTAL CA SCREEN DOC REV: CPT | Performed by: INTERNAL MEDICINE

## 2024-09-12 RX ORDER — LISINOPRIL 20 MG/1
20 TABLET ORAL DAILY
Qty: 90 TABLET | Refills: 3 | Status: SHIPPED | OUTPATIENT
Start: 2024-09-12

## 2024-09-21 LAB
ALBUMIN: 4.7 G/DL (ref 3.5–5.2)
ALK PHOSPHATASE: 81 U/L (ref 40–125)
ALT SERPL-CCNC: 21 U/L (ref 5–50)
AST SERPL-CCNC: 17 U/L (ref 9–50)
BILIRUB SERPL-MCNC: 0.6 MG/DL
BILIRUBIN DIRECT: 0.2 MG/DL (ref 0–0.3)
CHOLESTEROL, TOTAL: 84 MG/DL
CHOLESTEROL/HDL RATIO: 2.2 RATIO
HDLC SERPL-MCNC: 38 MG/DL
LDL CHOLESTEROL: 24 MG/DL
LDL/HDL RATIO: 0.6 RATIO
T4 FREE: 1.19 NG/DL (ref 0.8–1.9)
TOTAL PROTEIN: 7 G/DL (ref 6.1–8.3)
TRIGL SERPL-MCNC: 111 MG/DL
TSH SERPL DL<=0.05 MIU/L-ACNC: 4.27 UIU/ML (ref 0.4–4.1)
VLDLC SERPL CALC-MCNC: 22 MG/DL

## 2024-11-19 ENCOUNTER — TELEPHONE (OUTPATIENT)
Dept: CARDIOLOGY CLINIC | Age: 69
End: 2024-11-19

## 2024-12-11 ENCOUNTER — HOSPITAL ENCOUNTER (OUTPATIENT)
Age: 69
Discharge: HOME | End: 2024-12-13
Attending: INTERNAL MEDICINE
Payer: MEDICARE

## 2024-12-11 DIAGNOSIS — I25.10 CORONARY ARTERY DISEASE INVOLVING NATIVE CORONARY ARTERY OF NATIVE HEART WITHOUT ANGINA PECTORIS: ICD-10-CM

## 2024-12-11 DIAGNOSIS — I10 PRIMARY HYPERTENSION: ICD-10-CM

## 2024-12-11 DIAGNOSIS — E78.00 PURE HYPERCHOLESTEROLEMIA: ICD-10-CM

## 2024-12-11 DIAGNOSIS — R00.1 SINUS BRADYCARDIA: ICD-10-CM

## 2024-12-11 DIAGNOSIS — Z95.820 S/P ANGIOPLASTY WITH STENT: ICD-10-CM

## 2024-12-11 PROCEDURE — 93242 EXT ECG>48HR<7D RECORDING: CPT

## 2025-01-08 NOTE — PROGRESS NOTES
will see Casper Gonzalez back in: 1 year with download    Information added by my medical assistant/LPN was reviewed today    billing based on medical decision making     Emelia Roy, SISI-CNP   1/9/2025

## 2025-01-09 ENCOUNTER — OFFICE VISIT (OUTPATIENT)
Dept: PULMONOLOGY | Age: 70
End: 2025-01-09
Payer: MEDICARE

## 2025-01-09 VITALS
DIASTOLIC BLOOD PRESSURE: 68 MMHG | WEIGHT: 219.6 LBS | HEIGHT: 72 IN | BODY MASS INDEX: 29.74 KG/M2 | TEMPERATURE: 98 F | HEART RATE: 64 BPM | OXYGEN SATURATION: 97 % | SYSTOLIC BLOOD PRESSURE: 130 MMHG

## 2025-01-09 DIAGNOSIS — E66.09 CLASS 1 OBESITY DUE TO EXCESS CALORIES WITHOUT SERIOUS COMORBIDITY WITH BODY MASS INDEX (BMI) OF 31.0 TO 31.9 IN ADULT: ICD-10-CM

## 2025-01-09 DIAGNOSIS — G47.33 OSA ON CPAP: Primary | ICD-10-CM

## 2025-01-09 DIAGNOSIS — E66.811 CLASS 1 OBESITY DUE TO EXCESS CALORIES WITHOUT SERIOUS COMORBIDITY WITH BODY MASS INDEX (BMI) OF 31.0 TO 31.9 IN ADULT: ICD-10-CM

## 2025-01-09 PROCEDURE — 4004F PT TOBACCO SCREEN RCVD TLK: CPT | Performed by: NURSE PRACTITIONER

## 2025-01-09 PROCEDURE — G8427 DOCREV CUR MEDS BY ELIG CLIN: HCPCS | Performed by: NURSE PRACTITIONER

## 2025-01-09 PROCEDURE — G8417 CALC BMI ABV UP PARAM F/U: HCPCS | Performed by: NURSE PRACTITIONER

## 2025-01-09 PROCEDURE — 99214 OFFICE O/P EST MOD 30 MIN: CPT | Performed by: NURSE PRACTITIONER

## 2025-01-09 PROCEDURE — 1159F MED LIST DOCD IN RCRD: CPT | Performed by: NURSE PRACTITIONER

## 2025-01-09 PROCEDURE — 3078F DIAST BP <80 MM HG: CPT | Performed by: NURSE PRACTITIONER

## 2025-01-09 PROCEDURE — 1123F ACP DISCUSS/DSCN MKR DOCD: CPT | Performed by: NURSE PRACTITIONER

## 2025-01-09 PROCEDURE — 3075F SYST BP GE 130 - 139MM HG: CPT | Performed by: NURSE PRACTITIONER

## 2025-01-09 PROCEDURE — 3017F COLORECTAL CA SCREEN DOC REV: CPT | Performed by: NURSE PRACTITIONER

## 2025-01-18 DIAGNOSIS — G47.00 INSOMNIA, UNSPECIFIED TYPE: ICD-10-CM

## 2025-01-21 RX ORDER — TRAZODONE HYDROCHLORIDE 100 MG/1
100 TABLET ORAL NIGHTLY PRN
Qty: 90 TABLET | Refills: 0 | Status: SHIPPED | OUTPATIENT
Start: 2025-01-21

## 2025-03-12 ENCOUNTER — OFFICE VISIT (OUTPATIENT)
Dept: CARDIOLOGY CLINIC | Age: 70
End: 2025-03-12
Payer: MEDICARE

## 2025-03-12 VITALS
WEIGHT: 221.6 LBS | HEART RATE: 70 BPM | DIASTOLIC BLOOD PRESSURE: 76 MMHG | HEIGHT: 71 IN | BODY MASS INDEX: 31.02 KG/M2 | SYSTOLIC BLOOD PRESSURE: 139 MMHG

## 2025-03-12 DIAGNOSIS — E78.00 PURE HYPERCHOLESTEROLEMIA: ICD-10-CM

## 2025-03-12 DIAGNOSIS — I10 PRIMARY HYPERTENSION: ICD-10-CM

## 2025-03-12 DIAGNOSIS — R00.1 SINUS BRADYCARDIA: ICD-10-CM

## 2025-03-12 DIAGNOSIS — I25.10 CORONARY ARTERY DISEASE INVOLVING NATIVE CORONARY ARTERY OF NATIVE HEART WITHOUT ANGINA PECTORIS: Primary | ICD-10-CM

## 2025-03-12 DIAGNOSIS — Z95.820 S/P ANGIOPLASTY WITH STENT: ICD-10-CM

## 2025-03-12 PROCEDURE — 99214 OFFICE O/P EST MOD 30 MIN: CPT | Performed by: INTERNAL MEDICINE

## 2025-03-12 PROCEDURE — 1123F ACP DISCUSS/DSCN MKR DOCD: CPT | Performed by: INTERNAL MEDICINE

## 2025-03-12 PROCEDURE — 3078F DIAST BP <80 MM HG: CPT | Performed by: INTERNAL MEDICINE

## 2025-03-12 PROCEDURE — 3017F COLORECTAL CA SCREEN DOC REV: CPT | Performed by: INTERNAL MEDICINE

## 2025-03-12 PROCEDURE — G8427 DOCREV CUR MEDS BY ELIG CLIN: HCPCS | Performed by: INTERNAL MEDICINE

## 2025-03-12 PROCEDURE — 3075F SYST BP GE 130 - 139MM HG: CPT | Performed by: INTERNAL MEDICINE

## 2025-03-12 PROCEDURE — 1160F RVW MEDS BY RX/DR IN RCRD: CPT | Performed by: INTERNAL MEDICINE

## 2025-03-12 PROCEDURE — G8417 CALC BMI ABV UP PARAM F/U: HCPCS | Performed by: INTERNAL MEDICINE

## 2025-03-12 PROCEDURE — 1159F MED LIST DOCD IN RCRD: CPT | Performed by: INTERNAL MEDICINE

## 2025-03-12 PROCEDURE — 4004F PT TOBACCO SCREEN RCVD TLK: CPT | Performed by: INTERNAL MEDICINE

## 2025-03-12 RX ORDER — ASPIRIN 81 MG/1
81 TABLET ORAL DAILY
COMMUNITY
Start: 2025-03-12

## 2025-03-12 RX ORDER — ASPIRIN 325 MG
325 TABLET ORAL DAILY
COMMUNITY
End: 2025-03-12

## 2025-03-12 NOTE — PROGRESS NOTES
Chief Complaint   Patient presents with    Follow-up        Hx of   hospital  treatment forr NSTEMI and had cath and pci feb 2022        Patient here for 6 months follow up.  Patient didn't bring medication list or bottles today.  Patient states medications haven't changed.     Pt may have left  hip replacement with Dr. Booth  in future    Denied chest pain, sob, palpitations  or  edema    Once in a while dizziness on standing fast    Run out of his meds for the last 2 days - last dose of brilinta 2 days back and missed yesterday dose    No leg edema    Feel stronger after pci    Past Surgical History:   Procedure Laterality Date    JOINT REPLACEMENT      Rt hip replaced about 3 weeks ago       No Known Allergies     Family History   Problem Relation Age of Onset    Cancer Mother     Cancer Father         Social History     Socioeconomic History    Marital status:      Spouse name: Not on file    Number of children: Not on file    Years of education: Not on file    Highest education level: Not on file   Occupational History    Not on file   Tobacco Use    Smoking status: Never    Smokeless tobacco: Current     Types: Chew    Tobacco comments:     pt has been using chew for about 40 years   Vaping Use    Vaping status: Never Used   Substance and Sexual Activity    Alcohol use: Yes     Comment: very little    Drug use: Not Currently    Sexual activity: Not on file   Other Topics Concern    Not on file   Social History Narrative    Not on file     Social Drivers of Health     Financial Resource Strain: Low Risk  (6/13/2022)    Overall Financial Resource Strain (CARDIA)     Difficulty of Paying Living Expenses: Not hard at all   Food Insecurity: No Food Insecurity (6/13/2022)    Hunger Vital Sign     Worried About Running Out of Food in the Last Year: Never true     Ran Out of Food in the Last Year: Never true   Transportation Needs: Not on file   Physical Activity: Sufficiently Active (6/17/2024)    Exercise

## 2025-03-31 DIAGNOSIS — G25.81 RLS (RESTLESS LEGS SYNDROME): ICD-10-CM

## 2025-04-02 RX ORDER — GABAPENTIN 300 MG/1
CAPSULE ORAL
Qty: 30 CAPSULE | Refills: 9 | Status: SHIPPED | OUTPATIENT
Start: 2025-04-02 | End: 2025-12-28

## 2025-04-29 DIAGNOSIS — G47.00 INSOMNIA, UNSPECIFIED TYPE: ICD-10-CM

## 2025-04-30 RX ORDER — TRAZODONE HYDROCHLORIDE 100 MG/1
100 TABLET ORAL NIGHTLY PRN
Qty: 90 TABLET | Refills: 1 | Status: SHIPPED | OUTPATIENT
Start: 2025-04-30